# Patient Record
Sex: MALE | Race: WHITE | NOT HISPANIC OR LATINO | Employment: FULL TIME | URBAN - METROPOLITAN AREA
[De-identification: names, ages, dates, MRNs, and addresses within clinical notes are randomized per-mention and may not be internally consistent; named-entity substitution may affect disease eponyms.]

---

## 2018-01-17 ENCOUNTER — HOSPITAL ENCOUNTER (INPATIENT)
Facility: HOSPITAL | Age: 38
LOS: 3 days | Discharge: HOME | DRG: 083 | End: 2018-01-20
Attending: EMERGENCY MEDICINE | Admitting: PSYCHIATRY & NEUROLOGY
Payer: COMMERCIAL

## 2018-01-17 DIAGNOSIS — R40.20 LOSS OF CONSCIOUSNESS: ICD-10-CM

## 2018-01-17 DIAGNOSIS — I60.9 SAH (SUBARACHNOID HEMORRHAGE): Primary | ICD-10-CM
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
## 2018-01-17 DIAGNOSIS — S06.5XAA SUBDURAL HEMATOMA: ICD-10-CM
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
## 2018-01-17 DIAGNOSIS — R56.9 SEIZURE: ICD-10-CM

## 2018-01-17 LAB
ABO + RH BLD: NORMAL
ALBUMIN SERPL BCP-MCNC: 4.1 G/DL
ALP SERPL-CCNC: 71 U/L
ALT SERPL W/O P-5'-P-CCNC: 29 U/L
AMPHET+METHAMPHET UR QL: NEGATIVE
ANION GAP SERPL CALC-SCNC: 9 MMOL/L
AST SERPL-CCNC: 24 U/L
BARBITURATES UR QL SCN>200 NG/ML: NEGATIVE
BASOPHILS # BLD AUTO: 0.05 K/UL
BASOPHILS NFR BLD: 0.2 %
BENZODIAZ UR QL SCN>200 NG/ML: NEGATIVE
BILIRUB SERPL-MCNC: 0.4 MG/DL
BILIRUB UR QL STRIP: NEGATIVE
BLD GP AB SCN CELLS X3 SERPL QL: NORMAL
BUN SERPL-MCNC: 14 MG/DL
BZE UR QL SCN: NEGATIVE
CALCIUM SERPL-MCNC: 9.1 MG/DL
CANNABINOIDS UR QL SCN: NEGATIVE
CHLORIDE SERPL-SCNC: 102 MMOL/L
CLARITY UR REFRACT.AUTO: CLEAR
CO2 SERPL-SCNC: 27 MMOL/L
COLOR UR AUTO: YELLOW
CREAT SERPL-MCNC: 0.8 MG/DL
CREAT UR-MCNC: 67 MG/DL
DIFFERENTIAL METHOD: ABNORMAL
EOSINOPHIL # BLD AUTO: 0 K/UL
EOSINOPHIL NFR BLD: 0 %
ERYTHROCYTE [DISTWIDTH] IN BLOOD BY AUTOMATED COUNT: 12.6 %
EST. GFR  (AFRICAN AMERICAN): >60 ML/MIN/1.73 M^2
EST. GFR  (NON AFRICAN AMERICAN): >60 ML/MIN/1.73 M^2
ETHANOL UR-MCNC: <10 MG/DL
GLUCOSE SERPL-MCNC: 143 MG/DL
GLUCOSE UR QL STRIP: NEGATIVE
HCT VFR BLD AUTO: 43.8 %
HGB BLD-MCNC: 15.9 G/DL
HGB UR QL STRIP: NEGATIVE
IMM GRANULOCYTES # BLD AUTO: 0.2 K/UL
IMM GRANULOCYTES NFR BLD AUTO: 0.9 %
INR PPP: 1
KETONES UR QL STRIP: ABNORMAL
LACTATE SERPL-SCNC: 1.7 MMOL/L
LEUKOCYTE ESTERASE UR QL STRIP: NEGATIVE
LYMPHOCYTES # BLD AUTO: 1.2 K/UL
LYMPHOCYTES NFR BLD: 5.6 %
MCH RBC QN AUTO: 32.4 PG
MCHC RBC AUTO-ENTMCNC: 36.3 G/DL
MCV RBC AUTO: 89 FL
METHADONE UR QL SCN>300 NG/ML: NEGATIVE
MONOCYTES # BLD AUTO: 0.8 K/UL
MONOCYTES NFR BLD: 3.4 %
NEUTROPHILS # BLD AUTO: 19.7 K/UL
NEUTROPHILS NFR BLD: 89.9 %
NITRITE UR QL STRIP: NEGATIVE
NRBC BLD-RTO: 0 /100 WBC
OPIATES UR QL SCN: NEGATIVE
PCP UR QL SCN>25 NG/ML: NEGATIVE
PH UR STRIP: 7 [PH] (ref 5–8)
PLATELET # BLD AUTO: 304 K/UL
PMV BLD AUTO: 9.3 FL
POTASSIUM SERPL-SCNC: 4.2 MMOL/L
PROCALCITONIN SERPL IA-MCNC: 0.03 NG/ML
PROT SERPL-MCNC: 7.8 G/DL
PROT UR QL STRIP: NEGATIVE
PROTHROMBIN TIME: 10.5 SEC
RBC # BLD AUTO: 4.91 M/UL
SODIUM SERPL-SCNC: 138 MMOL/L
SP GR UR STRIP: >=1.03 (ref 1–1.03)
TOXICOLOGY INFORMATION: NORMAL
URN SPEC COLLECT METH UR: ABNORMAL
UROBILINOGEN UR STRIP-ACNC: NEGATIVE EU/DL
WBC # BLD AUTO: 21.96 K/UL

## 2018-01-17 PROCEDURE — 96366 THER/PROPH/DIAG IV INF ADDON: CPT

## 2018-01-17 PROCEDURE — 63600175 PHARM REV CODE 636 W HCPCS

## 2018-01-17 PROCEDURE — 85025 COMPLETE CBC W/AUTO DIFF WBC: CPT

## 2018-01-17 PROCEDURE — 85610 PROTHROMBIN TIME: CPT

## 2018-01-17 PROCEDURE — 83605 ASSAY OF LACTIC ACID: CPT

## 2018-01-17 PROCEDURE — 99221 1ST HOSP IP/OBS SF/LOW 40: CPT | Mod: ,,, | Performed by: NURSE PRACTITIONER

## 2018-01-17 PROCEDURE — 25500020 PHARM REV CODE 255: Performed by: EMERGENCY MEDICINE

## 2018-01-17 PROCEDURE — 12000002 HC ACUTE/MED SURGE SEMI-PRIVATE ROOM

## 2018-01-17 PROCEDURE — 96367 TX/PROPH/DG ADDL SEQ IV INF: CPT

## 2018-01-17 PROCEDURE — 99254 IP/OBS CNSLTJ NEW/EST MOD 60: CPT | Mod: ,,, | Performed by: NEUROLOGICAL SURGERY

## 2018-01-17 PROCEDURE — 96375 TX/PRO/DX INJ NEW DRUG ADDON: CPT | Mod: 59

## 2018-01-17 PROCEDURE — 63600175 PHARM REV CODE 636 W HCPCS: Performed by: PSYCHIATRY & NEUROLOGY

## 2018-01-17 PROCEDURE — 25000003 PHARM REV CODE 250: Performed by: PHYSICIAN ASSISTANT

## 2018-01-17 PROCEDURE — 96365 THER/PROPH/DIAG IV INF INIT: CPT

## 2018-01-17 PROCEDURE — 63600175 PHARM REV CODE 636 W HCPCS: Performed by: PHYSICIAN ASSISTANT

## 2018-01-17 PROCEDURE — 99291 CRITICAL CARE FIRST HOUR: CPT | Mod: 25

## 2018-01-17 PROCEDURE — 25000003 PHARM REV CODE 250: Performed by: PSYCHIATRY & NEUROLOGY

## 2018-01-17 PROCEDURE — 99291 CRITICAL CARE FIRST HOUR: CPT | Mod: ,,, | Performed by: EMERGENCY MEDICINE

## 2018-01-17 PROCEDURE — 63600175 PHARM REV CODE 636 W HCPCS: Performed by: EMERGENCY MEDICINE

## 2018-01-17 PROCEDURE — 80053 COMPREHEN METABOLIC PANEL: CPT

## 2018-01-17 PROCEDURE — 86850 RBC ANTIBODY SCREEN: CPT

## 2018-01-17 PROCEDURE — 63600175 PHARM REV CODE 636 W HCPCS: Performed by: NURSE PRACTITIONER

## 2018-01-17 PROCEDURE — 80307 DRUG TEST PRSMV CHEM ANLYZR: CPT

## 2018-01-17 PROCEDURE — 81003 URINALYSIS AUTO W/O SCOPE: CPT

## 2018-01-17 PROCEDURE — 84145 PROCALCITONIN (PCT): CPT

## 2018-01-17 RX ORDER — LEVETIRACETAM 10 MG/ML
1000 INJECTION INTRAVASCULAR
Status: COMPLETED | OUTPATIENT
Start: 2018-01-17 | End: 2018-01-17

## 2018-01-17 RX ORDER — ACETAMINOPHEN 325 MG/1
650 TABLET ORAL
Status: DISCONTINUED | OUTPATIENT
Start: 2018-01-17 | End: 2018-01-17

## 2018-01-17 RX ORDER — SODIUM CHLORIDE 9 MG/ML
INJECTION, SOLUTION INTRAVENOUS CONTINUOUS
Status: DISCONTINUED | OUTPATIENT
Start: 2018-01-17 | End: 2018-01-20 | Stop reason: HOSPADM

## 2018-01-17 RX ORDER — ONDANSETRON 2 MG/ML
4 INJECTION INTRAMUSCULAR; INTRAVENOUS EVERY 8 HOURS PRN
Status: DISCONTINUED | OUTPATIENT
Start: 2018-01-17 | End: 2018-01-20 | Stop reason: HOSPADM

## 2018-01-17 RX ORDER — LEVETIRACETAM 5 MG/ML
500 INJECTION INTRAVASCULAR
Status: DISCONTINUED | OUTPATIENT
Start: 2018-01-17 | End: 2018-01-17

## 2018-01-17 RX ORDER — DEXAMETHASONE SODIUM PHOSPHATE 4 MG/ML
10 INJECTION, SOLUTION INTRA-ARTICULAR; INTRALESIONAL; INTRAMUSCULAR; INTRAVENOUS; SOFT TISSUE ONCE
Status: COMPLETED | OUTPATIENT
Start: 2018-01-17 | End: 2018-01-17

## 2018-01-17 RX ORDER — ONDANSETRON 2 MG/ML
4 INJECTION INTRAMUSCULAR; INTRAVENOUS
Status: COMPLETED | OUTPATIENT
Start: 2018-01-17 | End: 2018-01-17

## 2018-01-17 RX ORDER — ACETAMINOPHEN 10 MG/ML
1000 INJECTION, SOLUTION INTRAVENOUS
Status: COMPLETED | OUTPATIENT
Start: 2018-01-17 | End: 2018-01-17

## 2018-01-17 RX ORDER — FENTANYL CITRATE 50 UG/ML
12.5 INJECTION, SOLUTION INTRAMUSCULAR; INTRAVENOUS
Status: COMPLETED | OUTPATIENT
Start: 2018-01-17 | End: 2018-01-17

## 2018-01-17 RX ORDER — FENTANYL CITRATE 50 UG/ML
12.5 INJECTION, SOLUTION INTRAMUSCULAR; INTRAVENOUS
Status: DISCONTINUED | OUTPATIENT
Start: 2018-01-17 | End: 2018-01-19

## 2018-01-17 RX ORDER — FENTANYL CITRATE 50 UG/ML
INJECTION, SOLUTION INTRAMUSCULAR; INTRAVENOUS
Status: COMPLETED
Start: 2018-01-17 | End: 2018-01-17

## 2018-01-17 RX ORDER — ACETAMINOPHEN 325 MG/1
650 TABLET ORAL EVERY 4 HOURS PRN
Status: DISCONTINUED | OUTPATIENT
Start: 2018-01-17 | End: 2018-01-19

## 2018-01-17 RX ORDER — NICARDIPINE HYDROCHLORIDE 0.2 MG/ML
2.5 INJECTION INTRAVENOUS CONTINUOUS
Status: DISCONTINUED | OUTPATIENT
Start: 2018-01-17 | End: 2018-01-18

## 2018-01-17 RX ADMIN — ACETAMINOPHEN 1000 MG: 10 INJECTION, SOLUTION INTRAVENOUS at 12:01

## 2018-01-17 RX ADMIN — LEVETIRACETAM 1000 MG: 10 INJECTION INTRAVENOUS at 01:01

## 2018-01-17 RX ADMIN — FENTANYL CITRATE 12.5 MCG: 50 INJECTION INTRAMUSCULAR; INTRAVENOUS at 06:01

## 2018-01-17 RX ADMIN — IOHEXOL 75 ML: 350 INJECTION, SOLUTION INTRAVENOUS at 02:01

## 2018-01-17 RX ADMIN — FENTANYL CITRATE 12.5 MCG: 50 INJECTION INTRAMUSCULAR; INTRAVENOUS at 02:01

## 2018-01-17 RX ADMIN — FENTANYL CITRATE 12.5 MCG: 50 INJECTION, SOLUTION INTRAMUSCULAR; INTRAVENOUS at 07:01

## 2018-01-17 RX ADMIN — SODIUM CHLORIDE: 0.9 INJECTION, SOLUTION INTRAVENOUS at 08:01

## 2018-01-17 RX ADMIN — LEVETIRACETAM 500 MG: 5 INJECTION INTRAVENOUS at 12:01

## 2018-01-17 RX ADMIN — ONDANSETRON 4 MG: 2 INJECTION INTRAMUSCULAR; INTRAVENOUS at 11:01

## 2018-01-17 RX ADMIN — FENTANYL CITRATE 12.5 MCG: 50 INJECTION, SOLUTION INTRAMUSCULAR; INTRAVENOUS at 02:01

## 2018-01-17 RX ADMIN — FENTANYL CITRATE 12.5 MCG: 50 INJECTION INTRAMUSCULAR; INTRAVENOUS at 04:01

## 2018-01-17 RX ADMIN — NICARDIPINE HYDROCHLORIDE 2.5 MG/HR: 0.2 INJECTION, SOLUTION INTRAVENOUS at 02:01

## 2018-01-17 RX ADMIN — DEXAMETHASONE SODIUM PHOSPHATE 10 MG: 4 INJECTION, SOLUTION INTRAMUSCULAR; INTRAVENOUS at 08:01

## 2018-01-17 RX ADMIN — ONDANSETRON 4 MG: 2 INJECTION INTRAMUSCULAR; INTRAVENOUS at 08:01

## 2018-01-17 NOTE — HPI
Thuan Reynolds is a 37 y.o. 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 + LOC of unknown duration. Possible witnessed seizure activity by apparent bystander who is not at bedside. CT head on arrival shows bifrontal hemorrhagic contusions. No witnessed seizure activity while in ED. Currently c/o severe frontal HA and photophobia. No anticoagulant use.

## 2018-01-17 NOTE — HPI
MR Reynolds is a 37 year old male with a PMH of bell's palsy who presents to [facility] s/p Fall with DX of SDH and SAH and witnesses seizure activity.  He went to work and the last thing that he recalls is walking out the back door of the clinic. He suffered a fall on icy ground outside, which was witnessed by a bystander who reported seizure type activity. He sustained loss of consciousness following the injury. Patient has amnesia to the event and reports waking in an ambulance with a severe diffuse frontal headache en route to the ED. He endorses nausea. He denies vision changes, neck pain/stiffness, rhino/ottorrhea, vomiting, weakness or extremity numbness. He is being admitted to [facility] for a higher level of care.

## 2018-01-17 NOTE — SUBJECTIVE & OBJECTIVE
(Not in a hospital admission)    Review of patient's allergies indicates:  No Known Allergies    History reviewed. No pertinent past medical history.  Past Surgical History:   Procedure Laterality Date    VARIOCOCELE REPAIR  2008    left side     Family History     Problem Relation (Age of Onset)    Diabetes Father    Heart failure Father        Social History Main Topics    Smoking status: Current Every Day Smoker     Years: 8.00     Types: Cigarettes    Smokeless tobacco: Never Used    Alcohol use Yes    Drug use: No    Sexual activity: Yes     Partners: Female     Review of Systems   Constitutional: no fever, chills or night sweats. No changes in weight   Eyes: no visual changes   ENT: no nasal congestion or sore throat   Respiratory: no cough or shortness of breath   Cardiovascular: no chest pain or palpitations   Gastrointestinal: no nausea or vomiting   Genitourinary: no hematuria or dysuria   Integument/Breast: no rash or pruritis   Hematologic/Lymphatic: no easy bruising or lymphadenopathy   Musculoskeletal: no arthralgias or myalgias.   Neurological: no seizures or tremors   Behavioral/Psych: no auditory or visual hallucinations   Endocrine: no heat or cold intolerance   Objective:     Weight: 111.1 kg (245 lb)  Body mass index is 36.18 kg/m².  Vital Signs (Most Recent):  Temp: 97.6 °F (36.4 °C) (01/17/18 1012)  Pulse: (!) 49 (01/17/18 1250)  Resp: 16 (01/17/18 1200)  BP: (!) 154/70 (01/17/18 1250)  SpO2: 98 % (01/17/18 1250) Vital Signs (24h Range):  Temp:  [97.6 °F (36.4 °C)] 97.6 °F (36.4 °C)  Pulse:  [49-68] 49  Resp:  [16-20] 16  SpO2:  [98 %-100 %] 98 %  BP: (122-161)/(68-87) 154/70                           Neurosurgery Physical Exam  General: well developed, well nourished, no distress.   Head: normocephalic, atraumatic  Neurologic: Alert and oriented. Thought content appropriate.  GCS: Motor: 6/Verbal: 5/Eyes: 4 GCS Total: 15  Mental Status: Awake, Alert, Oriented x 4  Language: No  aphasia  Speech: No dysarthria  Cranial nerves: face symmetric with L periorbital ecchymosis and mild L ptosis, tongue midline, CN II-XII grossly intact.   Eyes: pupils equal, round, reactive to light with accomodation, EOMI.  Pulmonary: normal respirations, no signs of respiratory distress  Abdomen: soft, non-distended, not tender to palpation  Sensory: intact to light touch throughout    Motor Strength: Moves all extremities spontaneously with good tone.  Full strength upper and lower extremities. No abnormal movements seen.     DTR's: 2 + and symmetric in UE and LE  Pronator Drift: no drift noted  Finger-to-nose: Intact bilaterally  Eng: absent  Clonus: absent  Babinski: absent  Pulses: 2+ and symmetric radial and dorsalis pedis.  Skin: Skin is warm, dry and intact.    Significant Labs:    Recent Labs  Lab 01/17/18  1247   *      K 4.2      CO2 27   BUN 14   CREATININE 0.8   CALCIUM 9.1       Recent Labs  Lab 01/17/18  1247   WBC 21.96*   HGB 15.9   HCT 43.8          Recent Labs  Lab 01/17/18  1247   INR 1.0     Microbiology Results (last 7 days)     ** No results found for the last 168 hours. **          Significant Diagnostics:  CT head 01/17/2018 reviewed.  Small regions of hyperdensity within the inferior frontal lobes bilaterally with subtle associated hypodensity compatible with hemorrhagic contusions and edema.    Small volume subarachnoid hemorrhage within the adjacent inferior frontal sulci and filling the olfactory recess.    Trace probable layering subdural hemorrhage along the tentorium cerebelli

## 2018-01-17 NOTE — ASSESSMENT & PLAN NOTE
37 y.o. male with traumatic bifrontal hemorrhagic contusions and tentorium SDH.    - Keppra 1g, then 500mg BID.  - Repeat CT 1730.  - Admit to NCC.   - Neuro checks Qhour.  - Will continue to follow closely.

## 2018-01-17 NOTE — CONSULTS
[facility]  Neurosurgery  Consult Note    Inpatient consult to Neurosurgery  Consult performed by: ERNESTINE PERES  Consult ordered by: SAL FENG        Subjective:     Chief Complaint/Reason for Admission: R bifrontal contusions    History of Present Illness: Thuan Reynolds is a 37 y.o. male without significant PMH who presents to [facility] ED after witnessed fall outside of his physical therapy clinic. + LOC of unknown duration. Possible witnessed seizure activity by apparent bystander who is not at bedside. CT head on arrival shows bifrontal hemorrhagic contusions. No witnessed seizure activity while in ED. Currently c/o severe frontal HA and photophobia. No anticoagulant use.      (Not in a hospital admission)    Review of patient's allergies indicates:  No Known Allergies    History reviewed. No pertinent past medical history.  Past Surgical History:   Procedure Laterality Date    VARIOCOCELE REPAIR  2008    left side     Family History     Problem Relation (Age of Onset)    Diabetes Father    Heart failure Father        Social History Main Topics    Smoking status: Current Every Day Smoker     Years: 8.00     Types: Cigarettes    Smokeless tobacco: Never Used    Alcohol use Yes    Drug use: No    Sexual activity: Yes     Partners: Female     Review of Systems   Constitutional: no fever, chills or night sweats. No changes in weight   Eyes: no visual changes   ENT: no nasal congestion or sore throat   Respiratory: no cough or shortness of breath   Cardiovascular: no chest pain or palpitations   Gastrointestinal: no nausea or vomiting   Genitourinary: no hematuria or dysuria   Integument/Breast: no rash or pruritis   Hematologic/Lymphatic: no easy bruising or lymphadenopathy   Musculoskeletal: no arthralgias or myalgias.   Neurological: no seizures or tremors   Behavioral/Psych: no auditory or visual hallucinations   Endocrine: no heat or cold intolerance   Objective:      Weight: 111.1 kg (245 lb)  Body mass index is 36.18 kg/m².  Vital Signs (Most Recent):  Temp: 97.6 °F (36.4 °C) (01/17/18 1012)  Pulse: (!) 49 (01/17/18 1250)  Resp: 16 (01/17/18 1200)  BP: (!) 154/70 (01/17/18 1250)  SpO2: 98 % (01/17/18 1250) Vital Signs (24h Range):  Temp:  [97.6 °F (36.4 °C)] 97.6 °F (36.4 °C)  Pulse:  [49-68] 49  Resp:  [16-20] 16  SpO2:  [98 %-100 %] 98 %  BP: (122-161)/(68-87) 154/70                           Neurosurgery Physical Exam  General: well developed, well nourished, no distress.   Head: normocephalic, atraumatic  Neurologic: Alert and oriented. Thought content appropriate.  GCS: Motor: 6/Verbal: 5/Eyes: 4 GCS Total: 15  Mental Status: Awake, Alert, Oriented x 4  Language: No aphasia  Speech: No dysarthria  Cranial nerves: face symmetric with L periorbital ecchymosis and mild L ptosis, tongue midline, CN II-XII grossly intact.   Eyes: pupils equal, round, reactive to light with accomodation, EOMI.  Pulmonary: normal respirations, no signs of respiratory distress  Abdomen: soft, non-distended, not tender to palpation  Sensory: intact to light touch throughout    Motor Strength: Moves all extremities spontaneously with good tone.  Full strength upper and lower extremities. No abnormal movements seen.     DTR's: 2 + and symmetric in UE and LE  Pronator Drift: no drift noted  Finger-to-nose: Intact bilaterally  Eng: absent  Clonus: absent  Babinski: absent  Pulses: 2+ and symmetric radial and dorsalis pedis.  Skin: Skin is warm, dry and intact.    Significant Labs:    Recent Labs  Lab 01/17/18  1247   *      K 4.2      CO2 27   BUN 14   CREATININE 0.8   CALCIUM 9.1       Recent Labs  Lab 01/17/18  1247   WBC 21.96*   HGB 15.9   HCT 43.8          Recent Labs  Lab 01/17/18  1247   INR 1.0     Microbiology Results (last 7 days)     ** No results found for the last 168 hours. **          Significant Diagnostics:  CT head 01/17/2018 reviewed.  Small regions  of hyperdensity within the inferior frontal lobes bilaterally with subtle associated hypodensity compatible with hemorrhagic contusions and edema.    Small volume subarachnoid hemorrhage within the adjacent inferior frontal sulci and filling the olfactory recess.    Trace probable layering subdural hemorrhage along the tentorium cerebelli    Assessment/Plan:     SAH (subarachnoid hemorrhage)    37 y.o. male with traumatic bifrontal hemorrhagic contusions and tentorium SDH.    - Keppra 1g, then 500mg BID.  - Repeat CT 1730.  - Admit to NCC.   - Neuro checks Qhour.  - Will continue to follow closely.            Discussed with Dr. Jose.    GODFREY VázquezC  Neurosurgery  [facility]

## 2018-01-17 NOTE — ASSESSMENT & PLAN NOTE
- NSGY consulted  - CTH: Small regions of hyperdensity within the inferior frontal lobes bilaterally with subtle associated hypodensity compatible with hemorrhagic contusions and edema.Small volume subarachnoid hemorrhage within the adjacent inferior frontal sulci and filling the olfactory recess. Trace probable layering subdural hemorrhage along the tentorium cerebelli  - Repeat CTH pending   - Neuro checks Q 1  - SBP < 140   - CTA pending   - PT/OT/SP  - NPO   - Vitals Q 1   -HOB 30

## 2018-01-17 NOTE — HOSPITAL COURSE
1/17: Admit NCC CTH: Small regions of hyperdensity within the inferior frontal lobes bilaterally with subtle associated hypodensity compatible with hemorrhagic contusions and edema.Small volume subarachnoid hemorrhage within the adjacent inferior frontal sulci and filling the olfactory recess. Trace probable layering subdural hemorrhage along the tentorium cerebelli  1/18: No significant clinical neurological dysfunction. No evidence of CSF leak. CT head with bifrontal hemorrhagic contusions at the base of the frontal lobe. CT C -spine OK.Mild change on the frontal lobe. WBC elevated.  `/`8: No significant clinical neurological dysfunction. No evidence of CSF leak. CT head with bifrontal hemorrhagic contusions at the base of the frontal lobe. CT C -spine OK.Mild change on the frontal lobe. WBC elevated.

## 2018-01-17 NOTE — ED NOTES
"Report received--pt c/o HA and N/V after hitting his head today--"my head is pounding"--denies blurred vision--pt doesn't remember falling--family at bedside  "

## 2018-01-17 NOTE — ED PROVIDER NOTES
Encounter Date: 1/17/2018    SCRIBE #1 NOTE: I, Mehnaz Mason, am scribing for, and in the presence of,  Dr. Zhou. I have scribed the following portions of the note - the APC attestation. Other sections scribed: CT Head Reading.       History     Chief Complaint   Patient presents with    Head Injury     Pt arrives [facility] from home after a fall sustaining hit to back of head - no obvious injury noted, but pt does not remember event and complains of pain to back of head      This is a 37 year old male with a PMH of bell's palsy who presents to the ED with a chief complaint of head injury. Patient reports waking in his usual state of health this morning. He went to work and the last thing that he recalls is walking out the back door of the clinic. He suffered a fall on icy ground outside, which was witnessed by an apparent bystander who is not at the bedside. There was reported seizure type activity. He sustained loss of consciousness following the injury, of unknown duration. Patient has amnesia to the event and reports waking in an ambulance with a severe diffuse frontal headache en route to the ED. He endorses nausea and photophobia. He denies vision changes, neck pain/stiffness, rhino/ottorrhea, vomiting, weakness or extremity numbness. Patient denies recent headaches or dizziness.          Review of patient's allergies indicates:  No Known Allergies  Past Medical History:   Diagnosis Date    Seizure      Past Surgical History:   Procedure Laterality Date    VARIOCOCELE REPAIR  2008    left side     Family History   Problem Relation Age of Onset    Diabetes Father     Heart failure Father      Social History   Substance Use Topics    Smoking status: Current Every Day Smoker     Years: 8.00     Types: Cigarettes    Smokeless tobacco: Never Used    Alcohol use Yes     Review of Systems   Constitutional: Negative for chills and fever.   HENT: Negative for sore throat.    Respiratory: Negative for shortness  of breath.    Cardiovascular: Negative for chest pain.   Gastrointestinal: Positive for nausea. Negative for vomiting.   Genitourinary: Negative for dysuria.   Musculoskeletal: Negative for back pain, neck pain and neck stiffness.   Skin: Negative for rash.   Neurological: Positive for headaches. Negative for weakness.   Hematological: Does not bruise/bleed easily.       Physical Exam     Initial Vitals [01/17/18 1012]   BP Pulse Resp Temp SpO2   (!) 140/85 68 20 97.6 °F (36.4 °C) 100 %      MAP       103.33         Physical Exam    Constitutional: He appears well-developed and well-nourished.   HENT:   Head: Head is with raccoon's eyes (left). Head is without Jones's sign, without abrasion and without contusion.   Right Ear: Tympanic membrane and ear canal normal.   Left Ear: Tympanic membrane and ear canal normal.   Nose: Nose normal. No rhinorrhea.   Mouth/Throat: Uvula is midline, oropharynx is clear and moist and mucous membranes are normal.   Eyes: Conjunctivae and EOM are normal. Pupils are equal, round, and reactive to light.   Neck: Normal range of motion and full passive range of motion without pain. Neck supple. No spinous process tenderness present. No neck rigidity.   Cardiovascular: Normal rate, regular rhythm and normal heart sounds.   Pulmonary/Chest: Breath sounds normal. No respiratory distress. He has no wheezes. He has no rhonchi. He has no rales.   Abdominal: Soft. Bowel sounds are normal. There is no tenderness. There is no rebound and no guarding.   Neurological: He is alert and oriented to person, place, and time. He has normal strength and normal reflexes. No sensory deficit.   Mild left ptosis.  Symmetrical forehead wrinkle.   No tongue deviation.   Skin: Skin is warm and dry. No rash noted.         ED Course   Procedures  Labs Reviewed   CBC W/ AUTO DIFFERENTIAL - Abnormal; Notable for the following:        Result Value    WBC 21.96 (*)     MCH 32.4 (*)     MCHC 36.3 (*)     Immature  Granulocytes 0.9 (*)     Gran # 19.7 (*)     Immature Grans (Abs) 0.20 (*)     Gran% 89.9 (*)     Lymph% 5.6 (*)     Mono% 3.4 (*)     All other components within normal limits   COMPREHENSIVE METABOLIC PANEL - Abnormal; Notable for the following:     Glucose 143 (*)     All other components within normal limits   URINALYSIS - Abnormal; Notable for the following:     Specific Gravity, UA >=1.030 (*)     Ketones, UA 1+ (*)     All other components within normal limits    Narrative:     extra urine   PROTIME-INR   TOXICOLOGY SCREEN, URINE, RANDOM (COMPLIANCE)    Narrative:     extra urine   PROCALCITONIN   LACTIC ACID, PLASMA   PROCALCITONIN    Narrative:     ADD ON PCAL PER MD ALVIN @  01/17/2018  14:41 ORDER #[id number]   TYPE & SCREEN   POCT GLUCOSE MONITORING CONTINUOUS         Imaging Results          X-ray chest AP portable (Final result)  Result time 01/17/18 15:33:36    Final result by Joaquín Barrios MD (01/17/18 15:33:36)                 Impression:      No acute chest disease identified.      Electronically signed by: JOAQUÍN BARRIOS MD  Date:     01/17/18  Time:    15:33              Narrative:    A single portable AP radiograph of the chest was obtained.  The heart and mediastinal structures are unremarkable.  Pulmonary vasculature is within normal limits.  The lungs are well aerated and free of focal consolidations.  There is no evidence for pneumothorax or large pleural effusions.  Bony structures appear intact.                             CTA Head (Final result)  Result time 01/17/18 14:41:50    Final result by Sean White DO (01/17/18 14:41:50)                 Impression:     Evolving bilateral inferior frontal hemorrhagic contusions with developing hypoattenuation compatible with associated edema.    Small components of extra-axial hemorrhage underlying the compatible with subarachnoid and a probable small component of subdural hemorrhages.    There is a subtle subcentimeter focus of additional  hemorrhage overlying the left parasagittal frontal lobe at the vertex which may be cortical or loculated subdural.    Continued trace probable subdural hemorrhage along the tentorium cerebelli.    Nondepressed left parietal calvarial fracture at the vertex    Unremarkable CTA of the head specifically without evidence for proximal significant stenosis or intracranial aneurysm.            Electronically signed by: JS HADLEY HAMEED  Date:     01/17/18  Time:    14:41              Narrative:       Procedure: Postcontrast CTA of the head     Technique:5-mm axial images the head  pre-and postcontrast in addition 0.5 mm axial CTA images of the head postcontrast with coronal and sagittal reformatted imaging. 100 cc of Omnipaque 350 IV contrast administered.      Comparison:CT head 01/17/2018          Results:    CT head with and without contrast:  Evolving bilateral inferior frontal hemorrhagic contusions with subadjacent extra-axial hyperdensity concerning for subarachnoid and subdural hemorrhages. There is a small additional hemorrhage within or overlying the left frontal cortex near the vertex.    There is a nondepressed fracture within the left parietal calvarium at the vertex extending from the parasagittal left coronal suture to the sagittal suture.      There is no significant increase mass effect or midline shift. Ventral stable without hydrocephalus.  There is no abnormal parenchymal enhancement allowing for scattered hemorrhages.    There is continued trace probable subdural hemorrhage along the superior surface of the tentorium cerebelli right greater than left.          CTA head:  Anterior circulation: The bilateral distal cervical, petrous, cavernous and supraclinoid segments of the ICAs are patent without significant focal stenosis or aneurysm.      Atherosclerotic plaquing of the cavernous segments of the ICAs.  The anterior and middle cerebral arteries are patent without significant focal stenosis or  aneurysm.    Posterior circulation: The distal left vertebral artery is slightly dominant.  The distal vertebral arteries, basilar artery and posterior cerebral arteries are patent without significant focal stenosis or aneurysm.  There are bilateral posterior communicating arteries.                             CT Maxillofacial Without Contrast (Final result)  Result time 01/17/18 14:46:45    Final result by Sean White DO (01/17/18 14:46:45)                 Impression:    Unremarkable CT maxillofacial bones specifically without evidence for acute maxillofacial bone fracture.    Please see above for additional details.          Electronically signed by: SEAN WHITE DO  Date:     01/17/18  Time:    14:46              Narrative:    Procedure:Noncontrast CT of the maxillofacial bones    Technique:2.5 mm axial images of the maxillofacial bones without contrast with coronal reformatted imaging from the axial acquisition      Comparison:Concomitant CTA head     Finding:Please note left parasagittal parietal calvarial fracture is not included in the field-of-view. Please see CTA head report for further details    There is no evidence for acute fracture of the maxilla facial bones. The bony orbits are intact. No evidence for mandibular fracture or dislocation. Partial cerumen opacification of the EACs bilaterally.    There is trace mucosal thickening in the ethmoid air cells. The heart there is slight rightward deviation the nasal septum. No significant additional paranasal sinus opacification. Please note the inferior frontal hemorrhagic contusions and scattered intracranial hemorrhages are less apparent related to differences in technique.                             CT Head Without Contrast (Final result)  Result time 01/17/18 11:54:44    Final result by Sean White DO (01/17/18 11:54:44)                 Impression:     Small regions of hyperdensity within the inferior frontal lobes bilaterally with subtle  associated hypodensity compatible with hemorrhagic contusions and edema.    Small volume subarachnoid hemorrhage within the adjacent inferior frontal sulci and filling the olfactory recess.    Trace probable layering subdural hemorrhage along the tentorium cerebelli    Clinical correlation and followup advised      Electronically signed by: JS BUTCHER DO  Date:     01/17/18  Time:    11:54              Narrative:    CT brain without contrast.    Comparison: None     Technique: Multiple 5 mm axial images of the head were obtained without intravenous contrast.    Results: There is small regions of hyperattenuation within the inferior frontal lobes bilaterally with subtle surrounding edema compatible with hemorrhagic contusions with adjacent hyperdensity within the extra-axial space concern for subarachnoid extension of hemorrhage.    In addition there is a thin hyperdense collection along the tentorium cerebelli measuring at most 1 mm in thickness slightly greater on the right concerning for small volume layering subdural hemorrhage.    The ventricles are normal without hydrocephalus. There is no midline shift or significant mass effect                                X-Rays:   Independently Interpreted Readings:   Head CT: Bilateral frontal contusions with SAH and SDH     Medical Decision Making:   History:   Old Medical Records: I decided to obtain old medical records.  Independently Interpreted Test(s):   I have ordered and independently interpreted X-rays - see prior notes.  Clinical Tests:   Lab Tests: Ordered and Reviewed  Radiological Study: Ordered and Reviewed  Other:   I have discussed this case with another health care provider.       APC / Resident Notes:   37 year old male presents with head injury, + LOC, and retrograde amnesia.  On exam he is afebrile and nontoxic. He appears uncomfortable. There are no superficial skin abrasions or open wounds. He has mild left ptosis with an otherwise normal  neurological exam.     DDX includes but is not limited to concussion, ICH, skull fracture.    Head CT with hemorrhagic contusions of the bilateral frontal lobes, subarachnoid hemorrhage within the inferior frontal sulci, and a subdural hematoma along the tentorium cerebelli.    12:14 PM Discussed with neurosurgery, appreciate consult.  Patient will be admitted to Neuro ICU.    12:20PM Patient's wife reports he had approximately 200ccs of emesis on the way to CT. New findings of left periorbital ecchymosis noted, CT Maxillofacial views ordered. Zofran given.    12:28 PM Start Keppra 500mg BID, per NSGY ELIZABETH Siegel.  Discussed with Neurocritical care, patient will be admitted to Neuro ICU.  I discussed the care of this patient with my supervising MD.        Scribe Attestation:   Scribe #1: I performed the above scribed service and the documentation accurately describes the services I performed. I attest to the accuracy of the note.    Attending Attestation:     Physician Attestation Statement for NP/PA:   I have conducted a face to face encounter with this patient in addition to the NP/PA, due to Medical Complexity    Other NP/PA Attestation Additions:      Medical Decision Makin year old male presenting after unwitnessed fall and possible seizure activity. Has amnesia to the event. On exam he complains of severe headache, has left periorbital bruising but otherwise no signs of trauma and a non focal neural exam. He has a CT that demonstrates hemorrhage, it not on anticoagulants. Neurosurgery consulted.      Attending Critical Care:   Critical Care Times:   Direct Patient Care (initial evaluation, reassessments, and time considering the case)................................................................25 minutes.   Additional History from reviewing old medical records or taking additional history from the family, EMS, PCP, etc.......................5 minutes.   Ordering, Reviewing, and Interpreting Diagnostic  Studies...............................................................................................................5 minutes.   Documentation..................................................................................................................................................................................5 minutes.   Consultation with other Physicians. .................................................................................................................................................10 minutes.   Consultation with the patient's family directly relating to the patient's condition, care, and DNR status (when patient unable)......5 minutes.   ==============================================================  · Total Critical Care Time - exclusive of procedural time: 55 minutes.  ==============================================================  Critical care was necessary to treat or prevent imminent or life-threatening deterioration of the following conditions: trauma.               ED Course      Clinical Impression:   The primary encounter diagnosis was SAH (subarachnoid hemorrhage). Diagnoses of Subdural hematoma, Fall, initial encounter, Loss of consciousness, and Seizure were also pertinent to this visit.    Disposition:   Disposition: Admitted  Condition: Critical                        Gabrielle Cantor PA-C  01/17/18 1707       Maryse Zhou MD  01/17/18 5685

## 2018-01-17 NOTE — MEDICAL/APP STUDENT
History     Chief Complaint   Patient presents with    Head Injury     Pt arrives [facility] from home after a fall sustaining hit to back of head - no obvious injury noted, but pt does not remember event and complains of pain to back of head      37 y.o. Male patient with a PMHx of R sided Bell's Palsey who presents to the ED s/p head injury. He reports being at his baseline of health when he was walking out of [facility] and fell and lost consciousness for unknown period of time. A bystander reported seeing him slip on ice and another reported seeing him have seizure-like activity. A co-worker found him unconscious and called an ambulance, which he was in when he regained consciousness.  He now has a severe headache that he describes as a pounding throughout but worse in the posterior aspect of his head. The pain radiates to his L eye, which is worse when closing his eyes. He also feels nauseous and dizzy since the event. His wife reports repetitive speech. Denies neck pain, blurred vision, diplopia, numbness, chest pain, weakness, fatigue, fever, or recent flu-like symptoms.             History reviewed. No pertinent past medical history.    Past Surgical History:   Procedure Laterality Date    VARIOCOCELE REPAIR  2008    left side       Family History   Problem Relation Age of Onset    Diabetes Father     Heart failure Father        Social History   Substance Use Topics    Smoking status: Current Every Day Smoker     Years: 8.00     Types: Cigarettes    Smokeless tobacco: Never Used    Alcohol use Yes       Review of Systems   Constitutional: Negative for chills, fatigue and fever.   HENT: Negative for sore throat.    Eyes: Positive for pain (left eye). Negative for photophobia and visual disturbance.        -diplopia, blurry vision   Respiratory: Negative for shortness of breath.    Cardiovascular: Negative for chest pain.   Gastrointestinal: Positive for nausea. Negative for abdominal pain,  "constipation, diarrhea and vomiting.   Genitourinary: Negative for dysuria and frequency.   Musculoskeletal: Negative for back pain, joint swelling, neck pain and neck stiffness.   Skin: Negative for rash.   Neurological: Positive for dizziness and headaches. Negative for weakness and numbness.   Hematological: Does not bruise/bleed easily.       Physical Exam   BP (!) 140/85 (BP Location: Left arm, Patient Position: Lying)   Pulse 68   Temp 97.6 °F (36.4 °C) (Oral)   Resp 20   Ht 5' 9" (1.753 m)   Wt 111.1 kg (245 lb)   SpO2 100%   BMI 36.18 kg/m²     Physical Exam    Nursing note and vitals reviewed.  Constitutional: He appears well-developed and well-nourished. He is not diaphoretic. No distress.   HENT:   Head: Normocephalic and atraumatic.   Right Ear: External ear normal.   Left Ear: External ear normal.   Nose: Nose normal.   Eyes: Conjunctivae and EOM are normal. Pupils are equal, round, and reactive to light.   Slight ptosis of the left eyelid.    Neck: Normal range of motion. Neck supple.   Cardiovascular: Normal rate, regular rhythm, normal heart sounds and intact distal pulses. Exam reveals no friction rub.    No murmur heard.  Pulmonary/Chest: Breath sounds normal. No respiratory distress. He has no wheezes. He has no rhonchi. He has no rales.   Abdominal: Soft. Bowel sounds are normal. There is no tenderness. There is no rebound and no guarding.   Musculoskeletal: Normal range of motion.   No cervical spine tenderness to palpation. Full ROM and strength 5/5 of cervical spine.   Neurological: He is alert and oriented to person, place, and time. He has normal strength. No cranial nerve deficit or sensory deficit.   Strength 5/5 and sensation intact throughout bilateral upper and lower extremities. Negative heel to shin. Oriented to person (relative's name), place (hospital name), and time (month and year).    Skin: Skin is warm.   Psychiatric: He has a normal mood and affect.         ED Course "

## 2018-01-17 NOTE — ED NOTES
The patient is asleep and responds to voice. Pt still c/o of severe headache. Family at bedside.     Airway is open and patent.  Respirations with normal effort and rate noted. Explanation of care provided to family and patient. No needs at this time. Will continue to monitor.

## 2018-01-17 NOTE — H&P
[facility]  Neurocritical Care  H&P     Admit Date: 1/17/2018  Service Date: 01/17/2018  Length of Stay: 0    Subjective:     Chief Complaint: SAH (subarachnoid hemorrhage)    History of Present Illness: MR Reynolds is a 37 year old male with a PMH of bell's palsy who presents to [facility] s/p Fall with DX of SDH and SAH and witnesses seizure activity.  He went to work and the last thing that he recalls is walking out the back door of the clinic. He suffered a fall on icy ground outside, which was witnessed by a bystander who reported seizure type activity. He sustained loss of consciousness following the injury. Patient has amnesia to the event and reports waking in an ambulance with a severe diffuse frontal headache en route to the ED. He endorses nausea. He denies vision changes, neck pain/stiffness, rhino/ottorrhea, vomiting, weakness or extremity numbness. He is being admitted to [facility] for a higher level of care.     Hospital Course: 1/17: Admit [facility] CTH: Small regions of hyperdensity within the inferior frontal lobes bilaterally with subtle associated hypodensity compatible with hemorrhagic contusions and edema.Small volume subarachnoid hemorrhage within the adjacent inferior frontal sulci and filling the olfactory recess. Trace probable layering subdural hemorrhage along the tentorium cerebelli    No new subjective & objective note has been filed under this hospital service since the last note was generated.    Assessment/Plan:     Neuro   * SAH (subarachnoid hemorrhage)    - NSGY consulted  - CTH: Small regions of hyperdensity within the inferior frontal lobes bilaterally with subtle associated hypodensity compatible with hemorrhagic contusions and edema.Small volume subarachnoid hemorrhage within the adjacent inferior frontal sulci and filling the olfactory recess. Trace probable layering subdural hemorrhage along the tentorium cerebelli  - Repeat CTH pending   - Neuro checks Q 1  - SBP < 140    - CTA pending   - PT/OT/SP  - NPO   - Vitals Q 1   -HOB 30        Seizure    - Keppra 500 q 12  - possible seizure activity post fall         Subdural hematoma    See above            Prophylaxis:  Venous Thromboembolism: mechanical  Stress Ulcer: NA  Ventilator Pneumonia: not applicable     Activity Orders          None        Full Code    Ronnell Amezcua, NP  Neurocritical Care  [facility]

## 2018-01-18 LAB
ALBUMIN SERPL BCP-MCNC: 3.9 G/DL
ALP SERPL-CCNC: 65 U/L
ALT SERPL W/O P-5'-P-CCNC: 24 U/L
ANION GAP SERPL CALC-SCNC: 10 MMOL/L
AST SERPL-CCNC: 20 U/L
BASOPHILS # BLD AUTO: 0.01 K/UL
BASOPHILS NFR BLD: 0.1 %
BILIRUB SERPL-MCNC: 0.7 MG/DL
BUN SERPL-MCNC: 16 MG/DL
CALCIUM SERPL-MCNC: 9.1 MG/DL
CHLORIDE SERPL-SCNC: 100 MMOL/L
CHOLEST SERPL-MCNC: 172 MG/DL
CHOLEST/HDLC SERPL: 3.2 {RATIO}
CO2 SERPL-SCNC: 25 MMOL/L
CREAT SERPL-MCNC: 0.8 MG/DL
DIFFERENTIAL METHOD: ABNORMAL
EOSINOPHIL # BLD AUTO: 0 K/UL
EOSINOPHIL NFR BLD: 0 %
ERYTHROCYTE [DISTWIDTH] IN BLOOD BY AUTOMATED COUNT: 12.5 %
EST. GFR  (AFRICAN AMERICAN): >60 ML/MIN/1.73 M^2
EST. GFR  (NON AFRICAN AMERICAN): >60 ML/MIN/1.73 M^2
GLUCOSE SERPL-MCNC: 144 MG/DL
HCT VFR BLD AUTO: 41.9 %
HDLC SERPL-MCNC: 54 MG/DL
HDLC SERPL: 31.4 %
HGB BLD-MCNC: 15 G/DL
IMM GRANULOCYTES # BLD AUTO: 0.11 K/UL
IMM GRANULOCYTES NFR BLD AUTO: 0.8 %
LDLC SERPL CALC-MCNC: 102.8 MG/DL
LYMPHOCYTES # BLD AUTO: 0.8 K/UL
LYMPHOCYTES NFR BLD: 5.4 %
MAGNESIUM SERPL-MCNC: 2.1 MG/DL
MCH RBC QN AUTO: 31.6 PG
MCHC RBC AUTO-ENTMCNC: 35.8 G/DL
MCV RBC AUTO: 88 FL
MONOCYTES # BLD AUTO: 0.3 K/UL
MONOCYTES NFR BLD: 2.1 %
NEUTROPHILS # BLD AUTO: 12.8 K/UL
NEUTROPHILS NFR BLD: 91.6 %
NONHDLC SERPL-MCNC: 118 MG/DL
NRBC BLD-RTO: 0 /100 WBC
PHOSPHATE SERPL-MCNC: 2.6 MG/DL
PLATELET # BLD AUTO: 299 K/UL
PMV BLD AUTO: 9.6 FL
POTASSIUM SERPL-SCNC: 3.9 MMOL/L
PROT SERPL-MCNC: 7.7 G/DL
RBC # BLD AUTO: 4.74 M/UL
SODIUM SERPL-SCNC: 135 MMOL/L
TRIGL SERPL-MCNC: 76 MG/DL
WBC # BLD AUTO: 14.02 K/UL

## 2018-01-18 PROCEDURE — 85025 COMPLETE CBC W/AUTO DIFF WBC: CPT

## 2018-01-18 PROCEDURE — 25000003 PHARM REV CODE 250: Performed by: STUDENT IN AN ORGANIZED HEALTH CARE EDUCATION/TRAINING PROGRAM

## 2018-01-18 PROCEDURE — 20600001 HC STEP DOWN PRIVATE ROOM

## 2018-01-18 PROCEDURE — 25000003 PHARM REV CODE 250: Performed by: PHYSICIAN ASSISTANT

## 2018-01-18 PROCEDURE — 94761 N-INVAS EAR/PLS OXIMETRY MLT: CPT

## 2018-01-18 PROCEDURE — 63600175 PHARM REV CODE 636 W HCPCS: Performed by: PSYCHIATRY & NEUROLOGY

## 2018-01-18 PROCEDURE — 83735 ASSAY OF MAGNESIUM: CPT

## 2018-01-18 PROCEDURE — 25000003 PHARM REV CODE 250: Performed by: PSYCHIATRY & NEUROLOGY

## 2018-01-18 PROCEDURE — 63600175 PHARM REV CODE 636 W HCPCS: Performed by: PHYSICIAN ASSISTANT

## 2018-01-18 PROCEDURE — 99232 SBSQ HOSP IP/OBS MODERATE 35: CPT | Mod: ,,, | Performed by: NEUROLOGICAL SURGERY

## 2018-01-18 PROCEDURE — 80061 LIPID PANEL: CPT

## 2018-01-18 PROCEDURE — 80053 COMPREHEN METABOLIC PANEL: CPT

## 2018-01-18 PROCEDURE — 84100 ASSAY OF PHOSPHORUS: CPT

## 2018-01-18 PROCEDURE — 99233 SBSQ HOSP IP/OBS HIGH 50: CPT | Mod: ,,, | Performed by: PSYCHIATRY & NEUROLOGY

## 2018-01-18 RX ORDER — LEVETIRACETAM 500 MG/1
500 TABLET ORAL 2 TIMES DAILY
Status: DISCONTINUED | OUTPATIENT
Start: 2018-01-18 | End: 2018-01-20 | Stop reason: HOSPADM

## 2018-01-18 RX ORDER — HEPARIN SODIUM 5000 [USP'U]/ML
5000 INJECTION, SOLUTION INTRAVENOUS; SUBCUTANEOUS EVERY 8 HOURS
Status: DISCONTINUED | OUTPATIENT
Start: 2018-01-18 | End: 2018-01-20 | Stop reason: HOSPADM

## 2018-01-18 RX ORDER — POTASSIUM CHLORIDE 20 MEQ/15ML
40 SOLUTION ORAL
Status: DISCONTINUED | OUTPATIENT
Start: 2018-01-18 | End: 2018-01-20 | Stop reason: HOSPADM

## 2018-01-18 RX ORDER — HYDROCODONE BITARTRATE AND ACETAMINOPHEN 5; 325 MG/1; MG/1
1 TABLET ORAL EVERY 6 HOURS PRN
Status: DISCONTINUED | OUTPATIENT
Start: 2018-01-18 | End: 2018-01-18

## 2018-01-18 RX ORDER — LANOLIN ALCOHOL/MO/W.PET/CERES
800 CREAM (GRAM) TOPICAL
Status: DISCONTINUED | OUTPATIENT
Start: 2018-01-18 | End: 2018-01-20 | Stop reason: HOSPADM

## 2018-01-18 RX ORDER — SODIUM,POTASSIUM PHOSPHATES 280-250MG
2 POWDER IN PACKET (EA) ORAL
Status: DISCONTINUED | OUTPATIENT
Start: 2018-01-18 | End: 2018-01-20 | Stop reason: HOSPADM

## 2018-01-18 RX ORDER — LEVETIRACETAM 500 MG/1
500 TABLET ORAL 2 TIMES DAILY
Status: DISCONTINUED | OUTPATIENT
Start: 2018-01-18 | End: 2018-01-18

## 2018-01-18 RX ORDER — AMOXICILLIN 250 MG
1 CAPSULE ORAL DAILY PRN
Status: DISCONTINUED | OUTPATIENT
Start: 2018-01-18 | End: 2018-01-20

## 2018-01-18 RX ORDER — HYDROCODONE BITARTRATE AND ACETAMINOPHEN 10; 325 MG/1; MG/1
1 TABLET ORAL EVERY 6 HOURS PRN
Status: DISCONTINUED | OUTPATIENT
Start: 2018-01-18 | End: 2018-01-19

## 2018-01-18 RX ADMIN — FENTANYL CITRATE 12.5 MCG: 50 INJECTION INTRAMUSCULAR; INTRAVENOUS at 04:01

## 2018-01-18 RX ADMIN — ACETAMINOPHEN 650 MG: 325 TABLET ORAL at 09:01

## 2018-01-18 RX ADMIN — FENTANYL CITRATE 12.5 MCG: 50 INJECTION INTRAMUSCULAR; INTRAVENOUS at 07:01

## 2018-01-18 RX ADMIN — SODIUM CHLORIDE TAB 1 GM 2 G: 1 TAB at 08:01

## 2018-01-18 RX ADMIN — FENTANYL CITRATE 12.5 MCG: 50 INJECTION INTRAMUSCULAR; INTRAVENOUS at 06:01

## 2018-01-18 RX ADMIN — HEPARIN SODIUM 5000 UNITS: 5000 INJECTION, SOLUTION INTRAVENOUS; SUBCUTANEOUS at 08:01

## 2018-01-18 RX ADMIN — NICARDIPINE HYDROCHLORIDE 2.5 MG/HR: 0.2 INJECTION, SOLUTION INTRAVENOUS at 06:01

## 2018-01-18 RX ADMIN — FENTANYL CITRATE 12.5 MCG: 50 INJECTION INTRAMUSCULAR; INTRAVENOUS at 08:01

## 2018-01-18 RX ADMIN — ACETAMINOPHEN 650 MG: 325 TABLET ORAL at 03:01

## 2018-01-18 RX ADMIN — HYDROCODONE BITARTRATE AND ACETAMINOPHEN 1 TABLET: 5; 325 TABLET ORAL at 05:01

## 2018-01-18 RX ADMIN — FENTANYL CITRATE 12.5 MCG: 50 INJECTION INTRAMUSCULAR; INTRAVENOUS at 11:01

## 2018-01-18 RX ADMIN — HYDROCODONE BITARTRATE AND ACETAMINOPHEN 1 TABLET: 5; 325 TABLET ORAL at 10:01

## 2018-01-18 RX ADMIN — LEVETIRACETAM 500 MG: 500 TABLET ORAL at 08:01

## 2018-01-18 RX ADMIN — SODIUM CHLORIDE TAB 1 GM 2 G: 1 TAB at 01:01

## 2018-01-18 RX ADMIN — FENTANYL CITRATE 12.5 MCG: 50 INJECTION INTRAMUSCULAR; INTRAVENOUS at 12:01

## 2018-01-18 RX ADMIN — HEPARIN SODIUM 5000 UNITS: 5000 INJECTION, SOLUTION INTRAVENOUS; SUBCUTANEOUS at 10:01

## 2018-01-18 RX ADMIN — FENTANYL CITRATE 12.5 MCG: 50 INJECTION INTRAMUSCULAR; INTRAVENOUS at 01:01

## 2018-01-18 RX ADMIN — LEVETIRACETAM 500 MG: 500 TABLET ORAL at 09:01

## 2018-01-18 NOTE — PLAN OF CARE
Problem: Patient Care Overview  Goal: Plan of Care Review  Outcome: Ongoing (interventions implemented as appropriate)  Patient arrived to floor from Neuro ICU. Patient AAOx4. VSS. Patient calm and cooperative. Oriented to room. Patient remains free of falls. Family at patient bedside.

## 2018-01-18 NOTE — PROGRESS NOTES
[facility]  Neurosurgery  Progress Note    Subjective:     History of Present Illness: Thuan Reynolds is a 37 y.o. male without significant PMH who [place] [facility] [place] witnessed fall outside of his physical therapy clinic. + LOC of unknown duration. Possible witnessed seizure activity by apparent bystander who is not at bedside. CT head on arrival shows bifrontal hemorrhagic contusions. No witnessed seizure activity while in ED. Currently c/o severe frontal HA and photophobia. No anticoagulant use.    Post-Op Info:  * No surgery found *         Prescriptions Prior to Admission   Medication Sig Dispense Refill Last Dose    predniSONE (DELTASONE) 5 MG tablet 6 PO BID x 5 days  4 PO BID x 1 day  3 PO BID x 1 day  2 PO BID x1 day  1 PO BID x 1 day    1 PO x 1 day 90 tablet 0 Not Taking       Review of patient's allergies indicates:  No Known Allergies    Past Medical History:   Diagnosis Date    Seizure      Past Surgical History:   Procedure Laterality Date    VARIOCOCELE REPAIR  2008    left side     Family History     Problem Relation (Age of Onset)    Diabetes Father    Heart failure Father        Social History Main Topics    Smoking status: Current Every Day Smoker     Years: 8.00     Types: Cigarettes    Smokeless tobacco: Never Used    Alcohol use Yes    Drug use: No    Sexual activity: Yes     Partners: Female     Review of Systems     GCS 15.    Constitutional: no fever, chills or night sweats. No changes in weight   Eyes: no visual changes   ENT: no nasal congestion or sore throat   Respiratory: no cough or shortness of breath   Cardiovascular: no chest pain or palpitations   Gastrointestinal: no nausea or vomiting   Genitourinary: no hematuria or dysuria   Integument/Breast: no rash or pruritis   Hematologic/Lymphatic: no easy bruising or lymphadenopathy   Musculoskeletal: no arthralgias or myalgias.   Neurological: no seizures or tremors   Behavioral/Psych: no auditory  or visual hallucinations   Endocrine: no heat or cold intolerance   Objective:     Weight: 103.6 kg (228 lb 6.3 oz)  Body mass index is 33.73 kg/m².  Vital Signs (Most Recent):  Temp: 98.3 °F (36.8 °C) (01/18/18 0700)  Pulse: (!) 54 (01/18/18 0818)  Resp: 18 (01/18/18 0818)  BP: (!) 103/59 (01/18/18 0818)  SpO2: 95 % (01/18/18 0818) Vital Signs (24h Range):  Temp:  [97.6 °F (36.4 °C)-98.7 °F (37.1 °C)] 98.3 °F (36.8 °C)  Pulse:  [] 54  Resp:  [14-22] 18  SpO2:  [92 %-100 %] 95 %  BP: (102-195)/(51-89) 103/59       Date 01/18/18 0700 - 01/19/18 0659   Shift 3527-6905 9082-5037 4374-7303 24 Hour Total   I  N  T  A  K  E   I.V.  (mL/kg) 45  (0.4)   45  (0.4)    Shift Total  (mL/kg) 45  (0.4)   45  (0.4)   O  U  T  P  U  T   Urine  (mL/kg/hr) 300   300    Shift Total  (mL/kg) 300  (2.9)   300  (2.9)   Weight (kg) 103.6 103.6 103.6 103.6                        Neurosurgery Physical Exam    General: well developed, well nourished, no distress.   Head: normocephalic, atraumatic  Neurologic: Alert and oriented. Thought content appropriate.  GCS: Motor: 6/Verbal: 5/Eyes: 4 GCS Total: 15  Mental Status: Awake, Alert, Oriented x 4  Language: No aphasia  Speech: No dysarthria  Cranial nerves: face symmetric with L periorbital ecchymosis and mild L ptosis, tongue midline, CN II-XII grossly intact.   Eyes: pupils equal, round, reactive to light with accomodation, EOMI.  Pulmonary: normal respirations, no signs of respiratory distress  Abdomen: soft, non-distended, not tender to palpation  Sensory: intact to light touch throughout    Motor Strength: Moves all extremities spontaneously with good tone.  Full strength upper and lower extremities. No abnormal movements seen.     DTR's: 2 + and symmetric in UE and LE  Pronator Drift: no drift noted  Finger-to-nose: Intact bilaterally  Eng: absent  Clonus: absent  Babinski: absent  Pulses: 2+ and symmetric radial and dorsalis pedis.  Skin: Skin is warm, dry and  intact.    Significant Labs:    Recent Labs  Lab 01/17/18  1247 01/18/18  0403   * 144*    135*   K 4.2 3.9    100   CO2 27 25   BUN 14 16   CREATININE 0.8 0.8   CALCIUM 9.1 9.1   MG  --  2.1       Recent Labs  Lab 01/17/18  1247 01/18/18  0403   WBC 21.96* 14.02*   HGB 15.9 15.0   HCT 43.8 41.9    299       Recent Labs  Lab 01/17/18  1247   INR 1.0     Microbiology Results (last 7 days)     ** No results found for the last 168 hours. **          Significant Diagnostics:  CT head 01/18/2018 reviewed.  Small regions of hyperdensity within the inferior frontal lobes bilaterally with subtle associated hypodensity compatible with hemorrhagic contusions and edema.    Small volume subarachnoid hemorrhage within the adjacent inferior frontal sulci and filling the olfactory recess.    Trace probable layering subdural hemorrhage along the tentorium cerebelli    Assessment/Plan:     * SAH (subarachnoid hemorrhage)    37 y.o. male with traumatic bifrontal hemorrhagic contusions and tentorium SDH.    --Continue care per primary team.  --Continue levetiracetam 500 bid for 7 days for early PTS PPX in setting of mild closed TBI.  --Recommend ICU level care for additional day, tomorrow pt likely can be stepped down to neurosurgical stepdown unit under neurosurgery service.  --We will continue to monitor closely, please contact us with any questions or concerns.             Rubén Lozano MD  Neurosurgery  [facility]

## 2018-01-18 NOTE — PLAN OF CARE
Problem: Patient Care Overview  Goal: Plan of Care Review  Outcome: Ongoing (interventions implemented as appropriate)  Plan of care reviewed with patient and patient's spouse at 1200 and verbalized understanding of the POC. Patient off cardene this morning , BP tolerating well. Still with complains of head pain, PRN pain medications given. On regular diet. NS increased to 100ml/hr. Patient with transfer orders, still waiting for room availability. Questions and concerns addressed. Please se flow sheet for detailed nursing assessment and VS. Will continue to monitor.

## 2018-01-18 NOTE — SUBJECTIVE & OBJECTIVE
Interval History:  >4 elements OR status of 3 inpatient conditions  No significant clinical neurological dysfunction. No evidence of CSF leak. CT head with bifrontal hemorrhagic contusions at the base of the frontal lobe. CT C -spine OK.Mild change on the frontal lobe. WBC elevated.  Review of Systems   Constitutional: Negative.    HENT: Negative.    Eyes: Negative.    Respiratory: Negative.    Cardiovascular: Negative.    Gastrointestinal: Negative.    Endocrine: Negative.    Genitourinary: Negative.    Musculoskeletal: Negative.      2 systems OR Unable to obtain a complete ROS due to level of consciousness.  Objective:     Vitals:  Temp: 98.3 °F (36.8 °C)  Pulse: (!) 57  Rhythm: sinus bradycardia  BP: 127/69  MAP (mmHg): 90  Resp: 16  SpO2: (!) 94 %  O2 Device (Oxygen Therapy): room air    Temp  Min: 98.3 °F (36.8 °C)  Max: 98.7 °F (37.1 °C)  Pulse  Min: 49  Max: 114  BP  Min: 100/54  Max: 195/84  MAP (mmHg)  Min: 73  Max: 121  Resp  Min: 14  Max: 25  SpO2  Min: 92 %  Max: 98 %    01/17 0701 - 01/18 0700  In: 375.7 [I.V.:375.7]  Out: 1100 [Urine:1100]           Physical Exam  Unable to test gait due to level of consciousness.    General   HEENT: L-orbital edema.  Chest Heart RRR / Lungs Clear to auscultation  Adbomen: Soft nontender + BS  Extremities: OK distal pulses.  Skin: UK  Neurological Exam:  MS; Alert, oriented to P/T/P/R, No language abnormalities. Normal mood.  CN: II-XII UK  Motor: LUE  5 /5 / RUE 5/5  Tone normal bilaterally             LLE 5 /5 /  RLE  5/5  Tone normal bilaterally  Sensory: LT/PP/T/ Vibration UK                 Complex sensory modalities: not tested  DTR:  normal throughout.  Coordination /Fine motor:   Gait: Not tested.  Meningeal signs: Absent    Medications:  Continuous  sodium chloride 0.9% Last Rate: 100 mL/hr at 01/18/18 1300   Scheduled  heparin (porcine) 5,000 Units Q8H   levETIRAcetam 500 mg BID   sodium chloride 2 g TID   PRN  acetaminophen 650 mg Q4H PRN   fentaNYL 12.5  mcg Q2H PRN   hydrocodone-acetaminophen 5-325mg 1 tablet Q6H PRN   magnesium oxide 800 mg PRN   magnesium oxide 800 mg PRN   ondansetron 4 mg Q8H PRN   potassium chloride 10% 40 mEq PRN   potassium chloride 10% 40 mEq PRN   potassium chloride 10% 40 mEq PRN   potassium, sodium phosphates 2 packet PRN   potassium, sodium phosphates 2 packet PRN   potassium, sodium phosphates 2 packet PRN   senna-docusate 8.6-50 mg 1 tablet Daily PRN     Today I personally reviewed pertinent medications, lines/drains/airways, imaging, cardiology, lab results, microbiology results, notably:    Diet  Diet Adult Regular  Diet Adult Regular  CMP:   Recent Labs  Lab 01/18/18  0403   CALCIUM 9.1   ALBUMIN 3.9   PROT 7.7   *   K 3.9   CO2 25      BUN 16   CREATININE 0.8   ALKPHOS 65   ALT 24   AST 20   BILITOT 0.7      BMP:   Recent Labs  Lab 01/18/18  0403   *   K 3.9      CO2 25   BUN 16   CREATININE 0.8   CALCIUM 9.1      CBC:   Recent Labs  Lab 01/18/18  0403   WBC 14.02*   RBC 4.74   HGB 15.0   HCT 41.9      MCV 88   MCH 31.6*   MCHC 35.8      Lipid Panel:   Recent Labs  Lab 01/18/18  0411   CHOL 172   LDLCALC 102.8   HDL 54   TRIG 76      Coagulation:   Recent Labs  Lab 01/17/18  1247   INR 1.0      Platelet Aggregation Study: No results for input(s): PLTAGG, PLTAGINTERP, PLTAGREGLACO, ADPPLTAGGREG in the last 168 hours.  Hgb A1C: No results for input(s): HGBA1C in the last 168 hours.  TSH: No results for input(s): TSH in the last 168 hours.

## 2018-01-18 NOTE — PROGRESS NOTES
[place] [facility] [place] [facility] [place] transport. Patient connected to cardiac monitor and assessment complete per LUCA Alejandra RN    Type of Stroke: SAH    Patients current symptoms include: headache    Past Medical History:   Diagnosis Date    Seizure        NCC notified of patient arrival.

## 2018-01-18 NOTE — PROGRESS NOTES
[facility]  Neurocritical Care  Progress Note    Admit Date: 1/17/2018  Service Date: 01/18/2018  Length of Stay: 1    Subjective:     Chief Complaint: SAH (subarachnoid hemorrhage)    History of Present Illness: MR Reynolds is a 37 year old male with a PMH of bell's palsy who presents to [facility] s/p Fall with DX of SDH and SAH and witnesses seizure activity.  He went to work and the last thing that he recalls is walking out the back door of the clinic. He suffered a fall on icy ground outside, which was witnessed by a bystander who reported seizure type activity. He sustained loss of consciousness following the injury. Patient has amnesia to the event and reports waking in an ambulance with a severe diffuse frontal headache en route to the ED. He endorses nausea. He denies vision changes, neck pain/stiffness, rhino/ottorrhea, vomiting, weakness or extremity numbness. He is being admitted to [facility] for a higher level of care.     Hospital Course: 1/17: Admit [facility] CTH: Small regions of hyperdensity within the inferior frontal lobes bilaterally with subtle associated hypodensity compatible with hemorrhagic contusions and edema.Small volume subarachnoid hemorrhage within the adjacent inferior frontal sulci and filling the olfactory recess. Trace probable layering subdural hemorrhage along the tentorium cerebelli  1/18: No significant clinical neurological dysfunction. No evidence of CSF leak. CT head with bifrontal hemorrhagic contusions at the base of the frontal lobe. CT C -spine OK.Mild change on the frontal lobe. WBC elevated.  `/`8: No significant clinical neurological dysfunction. No evidence of CSF leak. CT head with bifrontal hemorrhagic contusions at the base of the frontal lobe. CT C -spine OK.Mild change on the frontal lobe. WBC elevated.    Interval History:  >4 elements OR status of 3 inpatient conditions  No significant clinical neurological dysfunction. No evidence of CSF leak. CT head  with bifrontal hemorrhagic contusions at the base of the frontal lobe. CT C -spine OK.Mild change on the frontal lobe. WBC elevated.  Review of Systems   Constitutional: Negative.    HENT: Negative.    Eyes: Negative.    Respiratory: Negative.    Cardiovascular: Negative.    Gastrointestinal: Negative.    Endocrine: Negative.    Genitourinary: Negative.    Musculoskeletal: Negative.      2 systems OR Unable to obtain a complete ROS due to level of consciousness.  Objective:     Vitals:  Temp: 98.3 °F (36.8 °C)  Pulse: (!) 57  Rhythm: sinus bradycardia  BP: 127/69  MAP (mmHg): 90  Resp: 16  SpO2: (!) 94 %  O2 Device (Oxygen Therapy): room air    Temp  Min: 98.3 °F (36.8 °C)  Max: 98.7 °F (37.1 °C)  Pulse  Min: 49  Max: 114  BP  Min: 100/54  Max: 195/84  MAP (mmHg)  Min: 73  Max: 121  Resp  Min: 14  Max: 25  SpO2  Min: 92 %  Max: 98 %    01/17 0701 - 01/18 0700  In: 375.7 [I.V.:375.7]  Out: 1100 [Urine:1100]           Physical Exam  Unable to test gait due to level of consciousness.    General   HEENT: L-orbital edema.  Chest Heart RRR / Lungs Clear to auscultation  Adbomen: Soft nontender + BS  Extremities: OK distal pulses.  Skin: UK  Neurological Exam:  MS; Alert, oriented to P/T/P/R, No language abnormalities. Normal mood.  CN: II-XII UK  Motor: LUE  5 /5 / RUE 5/5  Tone normal bilaterally             LLE 5 /5 /  RLE  5/5  Tone normal bilaterally  Sensory: LT/PP/T/ Vibration                  Complex sensory modalities: not tested  DTR:  normal throughout.  Coordination /Fine motor: UK  Gait: Not tested.  Meningeal signs: Absent    Medications:  Continuous  sodium chloride 0.9% Last Rate: 100 mL/hr at 01/18/18 1300   Scheduled  heparin (porcine) 5,000 Units Q8H   levETIRAcetam 500 mg BID   sodium chloride 2 g TID   PRN  acetaminophen 650 mg Q4H PRN   fentaNYL 12.5 mcg Q2H PRN   hydrocodone-acetaminophen 5-325mg 1 tablet Q6H PRN   magnesium oxide 800 mg PRN   magnesium oxide 800 mg PRN   ondansetron 4 mg Q8H PRN    potassium chloride 10% 40 mEq PRN   potassium chloride 10% 40 mEq PRN   potassium chloride 10% 40 mEq PRN   potassium, sodium phosphates 2 packet PRN   potassium, sodium phosphates 2 packet PRN   potassium, sodium phosphates 2 packet PRN   senna-docusate 8.6-50 mg 1 tablet Daily PRN     Today I personally reviewed pertinent medications, lines/drains/airways, imaging, cardiology, lab results, microbiology results, notably:    Diet  Diet Adult Regular  Diet Adult Regular  CMP:   Recent Labs  Lab 01/18/18  0403   CALCIUM 9.1   ALBUMIN 3.9   PROT 7.7   *   K 3.9   CO2 25      BUN 16   CREATININE 0.8   ALKPHOS 65   ALT 24   AST 20   BILITOT 0.7      BMP:   Recent Labs  Lab 01/18/18  0403   *   K 3.9      CO2 25   BUN 16   CREATININE 0.8   CALCIUM 9.1      CBC:   Recent Labs  Lab 01/18/18  0403   WBC 14.02*   RBC 4.74   HGB 15.0   HCT 41.9      MCV 88   MCH 31.6*   MCHC 35.8      Lipid Panel:   Recent Labs  Lab 01/18/18  0411   CHOL 172   LDLCALC 102.8   HDL 54   TRIG 76      Coagulation:   Recent Labs  Lab 01/17/18  1247   INR 1.0      Platelet Aggregation Study: No results for input(s): PLTAGG, PLTAGINTERP, PLTAGREGLACO, ADPPLTAGGREG in the last 168 hours.  Hgb A1C: No results for input(s): HGBA1C in the last 168 hours.  TSH: No results for input(s): TSH in the last 168 hours.            Assessment/Plan:     Neuro   * SAH (subarachnoid hemorrhage)    - NSGY consulted  - CTH: Small regions of hyperdensity within the inferior frontal lobes bilaterally with subtle associated hypodensity compatible with hemorrhagic contusions and edema.Small volume subarachnoid hemorrhage within the adjacent inferior frontal sulci and filling the olfactory recess. Trace probable layering subdural hemorrhage along the tentorium cerebelli  - Repeat CTH pending   - Neuro checks Q 1  - SBP < 140   - CTA pending   - PT/OT/SP  - NPO   - Vitals Q 1   -HOB 30        Seizure    - Keppra 500 q 12  - possible seizure  activity post fall         Subdural hematoma    See above            Active Problem List:   1.[facility] after fall with TBI  2. Secondary bifrontal hemorrhagic contusions  3. Hx Bell's palsy.  4. Traumatic SAH.   5. Piost traumatic SZ  6. Small nondepressed left parietal calvarial fracture again noted.        Assessment / Plan:     Neuro:  -Keppra 500mg q 12hrs and keep for now  -Telanol PRN  -PT/OT  -EEG 1 hr.  -Norco 5/325 q 4hrs PRN  Resp:  RA  IS  CV: Keep SBP <=160 mmHg  -TTE  -12 lead ECG  -Cardene  IVD off.   IVF/nutrition/Renal/GI:  -PO diet passed swallowing  -NS at 100cc/hr IVD  -BR  Hem / ID: [id number]  UK  Endo:  -UK  Prophylaxis:  SC Heparin prophylaxis    SCDs  Advance Directives and Disposition:    Full Code. Consider transferring him to NSU SDH.        Uninterrupted level 3  Follow-up /Counseling Time (not including procedures):  = 35 min          Activity Orders          None        Full Code    Amilcar Roach MD  Neurocritical Care  [facility]

## 2018-01-18 NOTE — ED NOTES
Patient and family updated that repeat scan will be at 0300 this morning. NCC aware that patients headache remains a 9/10, TAMELA Reynaga stated to give patient 12.5mcg of fentanyl now.

## 2018-01-18 NOTE — PROGRESS NOTES
Transferred patient to room 1025 via wheelchair. No acute events during the transfer. Receiving TARSHA Braden notified.

## 2018-01-18 NOTE — SUBJECTIVE & OBJECTIVE
Prescriptions Prior to Admission   Medication Sig Dispense Refill Last Dose    predniSONE (DELTASONE) 5 MG tablet 6 PO BID x 5 days  4 PO BID x 1 day  3 PO BID x 1 day  2 PO BID x1 day  1 PO BID x 1 day    1 PO x 1 day 90 tablet 0 Not Taking       Review of patient's allergies indicates:  No Known Allergies    Past Medical History:   Diagnosis Date    Seizure      Past Surgical History:   Procedure Laterality Date    VARIOCOCELE REPAIR  2008    left side     Family History     Problem Relation (Age of Onset)    Diabetes Father    Heart failure Father        Social History Main Topics    Smoking status: Current Every Day Smoker     Years: 8.00     Types: Cigarettes    Smokeless tobacco: Never Used    Alcohol use Yes    Drug use: No    Sexual activity: Yes     Partners: Female     Review of Systems     GCS 15.    Constitutional: no fever, chills or night sweats. No changes in weight   Eyes: no visual changes   ENT: no nasal congestion or sore throat   Respiratory: no cough or shortness of breath   Cardiovascular: no chest pain or palpitations   Gastrointestinal: no nausea or vomiting   Genitourinary: no hematuria or dysuria   Integument/Breast: no rash or pruritis   Hematologic/Lymphatic: no easy bruising or lymphadenopathy   Musculoskeletal: no arthralgias or myalgias.   Neurological: no seizures or tremors   Behavioral/Psych: no auditory or visual hallucinations   Endocrine: no heat or cold intolerance   Objective:     Weight: 103.6 kg (228 lb 6.3 oz)  Body mass index is 33.73 kg/m².  Vital Signs (Most Recent):  Temp: 98.3 °F (36.8 °C) (01/18/18 0700)  Pulse: (!) 54 (01/18/18 0818)  Resp: 18 (01/18/18 0818)  BP: (!) 103/59 (01/18/18 0818)  SpO2: 95 % (01/18/18 0818) Vital Signs (24h Range):  Temp:  [97.6 °F (36.4 °C)-98.7 °F (37.1 °C)] 98.3 °F (36.8 °C)  Pulse:  [] 54  Resp:  [14-22] 18  SpO2:  [92 %-100 %] 95 %  BP: (102-195)/(51-89) 103/59       Date 01/18/18 0700 - 01/19/18 0659   Shift 2467-5538  7851-8190 0983-0293 24 Hour Total   I  N  T  A  K  E   I.V.  (mL/kg) 45  (0.4)   45  (0.4)    Shift Total  (mL/kg) 45  (0.4)   45  (0.4)   O  U  T  P  U  T   Urine  (mL/kg/hr) 300   300    Shift Total  (mL/kg) 300  (2.9)   300  (2.9)   Weight (kg) 103.6 103.6 103.6 103.6                        Neurosurgery Physical Exam    General: well developed, well nourished, no distress.   Head: normocephalic, atraumatic  Neurologic: Alert and oriented. Thought content appropriate.  GCS: Motor: 6/Verbal: 5/Eyes: 4 GCS Total: 15  Mental Status: Awake, Alert, Oriented x 4  Language: No aphasia  Speech: No dysarthria  Cranial nerves: face symmetric with L periorbital ecchymosis and mild L ptosis, tongue midline, CN II-XII grossly intact.   Eyes: pupils equal, round, reactive to light with accomodation, EOMI.  Pulmonary: normal respirations, no signs of respiratory distress  Abdomen: soft, non-distended, not tender to palpation  Sensory: intact to light touch throughout    Motor Strength: Moves all extremities spontaneously with good tone.  Full strength upper and lower extremities. No abnormal movements seen.     DTR's: 2 + and symmetric in UE and LE  Pronator Drift: no drift noted  Finger-to-nose: Intact bilaterally  Eng: absent  Clonus: absent  Babinski: absent  Pulses: 2+ and symmetric radial and dorsalis pedis.  Skin: Skin is warm, dry and intact.    Significant Labs:    Recent Labs  Lab 01/17/18  1247 01/18/18  0403   * 144*    135*   K 4.2 3.9    100   CO2 27 25   BUN 14 16   CREATININE 0.8 0.8   CALCIUM 9.1 9.1   MG  --  2.1       Recent Labs  Lab 01/17/18  1247 01/18/18  0403   WBC 21.96* 14.02*   HGB 15.9 15.0   HCT 43.8 41.9    299       Recent Labs  Lab 01/17/18  1247   INR 1.0     Microbiology Results (last 7 days)     ** No results found for the last 168 hours. **          Significant Diagnostics:  CT head 01/18/2018 reviewed.  Small regions of hyperdensity within the inferior frontal  lobes bilaterally with subtle associated hypodensity compatible with hemorrhagic contusions and edema.    Small volume subarachnoid hemorrhage within the adjacent inferior frontal sulci and filling the olfactory recess.    Trace probable layering subdural hemorrhage along the tentorium cerebelli

## 2018-01-18 NOTE — ASSESSMENT & PLAN NOTE
37 y.o. male with traumatic bifrontal hemorrhagic contusions and tentorium SDH.    --Continue care per primary team.  --Continue levetiracetam 500 bid for 7 days for early PTS PPX in setting of mild closed TBI.  --Recommend ICU level care for additional day, tomorrow pt likely can be stepped down to neurosurgical stepdown unit under neurosurgery service.  --We will continue to monitor closely, please contact us with any questions or concerns.

## 2018-01-18 NOTE — PLAN OF CARE
[facility] [address] - [place] - [address]  [address]  [place] [address]  Phone: [phone] Fax: [phone]    This CM spoke with patient spouse at bedside.       01/18/18 1406   Discharge Assessment   Assessment Type Discharge Planning Assessment   Confirmed/corrected address and phone number on facesheet? Yes   Assessment information obtained from? Caregiver   Expected Length of Stay (days) 3   Communicated expected length of stay with patient/caregiver yes   Prior to hospitilization cognitive status: Alert/Oriented   Prior to hospitalization functional status: Independent   Current cognitive status: Alert/Oriented   Current Functional Status: Needs Assistance   Facility Arrived From: (ambulance from [facility])   Lives With spouse  (spouse Arleth Reinoso [phone])   Able to Return to Prior Arrangements yes   Is patient able to care for self after discharge? Yes   Who are your caregiver(s) and their phone number(s)? (spouse Arleth Reynolds [phone])   Patient's perception of discharge disposition home or selfcare   Readmission Within The Last 30 Days no previous admission in last 30 days   Patient currently being followed by outpatient case management? No   Patient currently receives any other outside agency services? No   Equipment Currently Used at Home none   Do you have any problems affording any of your prescribed medications? No   Is the patient taking medications as prescribed? yes   Does the patient have transportation home? Yes   Does the patient receive services at the [facility]? No   Discharge Plan A Home   Discharge Plan B Home with family   Patient/Family In Agreement With Plan yes     Berna Sorto RN/BSN/JOSELUIS  [phone]  [facility]

## 2018-01-18 NOTE — PLAN OF CARE
Problem: Patient Care Overview  Goal: Plan of Care Review  Outcome: Ongoing (interventions implemented as appropriate)  No acute events occurred throughout the shift. See flowsheets for assessments and VS. Plan of care reviewed with Thuan Reynolds and Thuan Reynolds's family. All questions answered in turn. Pt arrived to unit at 0355. Pt oriented to room and call light within reach. Pt complaining of a severe headache. Pt progressing towards goals as noted. Will continue to monitor.

## 2018-01-19 LAB
ALBUMIN SERPL BCP-MCNC: 3.4 G/DL
ALP SERPL-CCNC: 54 U/L
ALT SERPL W/O P-5'-P-CCNC: 20 U/L
ANION GAP SERPL CALC-SCNC: 9 MMOL/L
AST SERPL-CCNC: 16 U/L
BILIRUB SERPL-MCNC: 0.5 MG/DL
BUN SERPL-MCNC: 21 MG/DL
CALCIUM SERPL-MCNC: 8.9 MG/DL
CHLORIDE SERPL-SCNC: 106 MMOL/L
CO2 SERPL-SCNC: 22 MMOL/L
CREAT SERPL-MCNC: 0.7 MG/DL
EST. GFR  (AFRICAN AMERICAN): >60 ML/MIN/1.73 M^2
EST. GFR  (NON AFRICAN AMERICAN): >60 ML/MIN/1.73 M^2
GLUCOSE SERPL-MCNC: 105 MG/DL
MAGNESIUM SERPL-MCNC: 2.2 MG/DL
PHOSPHATE SERPL-MCNC: 2.3 MG/DL
POTASSIUM SERPL-SCNC: 4.1 MMOL/L
PROT SERPL-MCNC: 6.8 G/DL
SODIUM SERPL-SCNC: 137 MMOL/L

## 2018-01-19 PROCEDURE — 25000003 PHARM REV CODE 250: Performed by: STUDENT IN AN ORGANIZED HEALTH CARE EDUCATION/TRAINING PROGRAM

## 2018-01-19 PROCEDURE — 25000003 PHARM REV CODE 250: Performed by: PSYCHIATRY & NEUROLOGY

## 2018-01-19 PROCEDURE — 20600001 HC STEP DOWN PRIVATE ROOM

## 2018-01-19 PROCEDURE — 63600175 PHARM REV CODE 636 W HCPCS: Performed by: STUDENT IN AN ORGANIZED HEALTH CARE EDUCATION/TRAINING PROGRAM

## 2018-01-19 PROCEDURE — 84100 ASSAY OF PHOSPHORUS: CPT

## 2018-01-19 PROCEDURE — 63600175 PHARM REV CODE 636 W HCPCS: Performed by: PHYSICIAN ASSISTANT

## 2018-01-19 PROCEDURE — 80053 COMPREHEN METABOLIC PANEL: CPT

## 2018-01-19 PROCEDURE — 83735 ASSAY OF MAGNESIUM: CPT

## 2018-01-19 PROCEDURE — 25000003 PHARM REV CODE 250: Performed by: PHYSICIAN ASSISTANT

## 2018-01-19 PROCEDURE — 36415 COLL VENOUS BLD VENIPUNCTURE: CPT

## 2018-01-19 RX ORDER — FENTANYL CITRATE 50 UG/ML
25 INJECTION, SOLUTION INTRAMUSCULAR; INTRAVENOUS
Status: DISCONTINUED | OUTPATIENT
Start: 2018-01-19 | End: 2018-01-20 | Stop reason: HOSPADM

## 2018-01-19 RX ORDER — FENTANYL CITRATE 50 UG/ML
25 INJECTION, SOLUTION INTRAMUSCULAR; INTRAVENOUS ONCE
Status: COMPLETED | OUTPATIENT
Start: 2018-01-19 | End: 2018-01-19

## 2018-01-19 RX ORDER — BUTALBITAL, ACETAMINOPHEN AND CAFFEINE 50; 325; 40 MG/1; MG/1; MG/1
1 TABLET ORAL EVERY 4 HOURS PRN
Status: DISCONTINUED | OUTPATIENT
Start: 2018-01-19 | End: 2018-01-19

## 2018-01-19 RX ORDER — ACETAMINOPHEN 10 MG/ML
1000 INJECTION, SOLUTION INTRAVENOUS EVERY 8 HOURS
Status: DISPENSED | OUTPATIENT
Start: 2018-01-19 | End: 2018-01-20

## 2018-01-19 RX ORDER — ACETAMINOPHEN 10 MG/ML
1000 INJECTION, SOLUTION INTRAVENOUS EVERY 8 HOURS
Status: DISCONTINUED | OUTPATIENT
Start: 2018-01-19 | End: 2018-01-19

## 2018-01-19 RX ORDER — BUTALBITAL, ACETAMINOPHEN AND CAFFEINE 50; 325; 40 MG/1; MG/1; MG/1
1 TABLET ORAL EVERY 4 HOURS PRN
Status: DISCONTINUED | OUTPATIENT
Start: 2018-01-19 | End: 2018-01-20 | Stop reason: HOSPADM

## 2018-01-19 RX ORDER — OXYCODONE HYDROCHLORIDE 5 MG/1
10 TABLET ORAL EVERY 6 HOURS PRN
Status: DISCONTINUED | OUTPATIENT
Start: 2018-01-19 | End: 2018-01-20 | Stop reason: HOSPADM

## 2018-01-19 RX ADMIN — ACETAMINOPHEN 650 MG: 325 TABLET ORAL at 04:01

## 2018-01-19 RX ADMIN — LEVETIRACETAM 500 MG: 500 TABLET ORAL at 09:01

## 2018-01-19 RX ADMIN — HEPARIN SODIUM 5000 UNITS: 5000 INJECTION, SOLUTION INTRAVENOUS; SUBCUTANEOUS at 01:01

## 2018-01-19 RX ADMIN — SODIUM CHLORIDE TAB 1 GM 2 G: 1 TAB at 09:01

## 2018-01-19 RX ADMIN — BUTALBITAL, ACETAMINOPHEN AND CAFFEINE 1 TABLET: 50; 325; 40 TABLET ORAL at 03:01

## 2018-01-19 RX ADMIN — SODIUM CHLORIDE TAB 1 GM 2 G: 1 TAB at 01:01

## 2018-01-19 RX ADMIN — HYDROCODONE BITARTRATE AND ACETAMINOPHEN 1 TABLET: 10; 325 TABLET ORAL at 12:01

## 2018-01-19 RX ADMIN — HEPARIN SODIUM 5000 UNITS: 5000 INJECTION, SOLUTION INTRAVENOUS; SUBCUTANEOUS at 09:01

## 2018-01-19 RX ADMIN — HEPARIN SODIUM 5000 UNITS: 5000 INJECTION, SOLUTION INTRAVENOUS; SUBCUTANEOUS at 06:01

## 2018-01-19 RX ADMIN — BUTALBITAL, ACETAMINOPHEN AND CAFFEINE 1 TABLET: 50; 325; 40 TABLET ORAL at 07:01

## 2018-01-19 RX ADMIN — LEVETIRACETAM 500 MG: 500 TABLET ORAL at 08:01

## 2018-01-19 RX ADMIN — ACETAMINOPHEN 1000 MG: 10 INJECTION, SOLUTION INTRAVENOUS at 10:01

## 2018-01-19 RX ADMIN — OXYCODONE HYDROCHLORIDE 10 MG: 5 TABLET ORAL at 01:01

## 2018-01-19 RX ADMIN — SODIUM CHLORIDE TAB 1 GM 2 G: 1 TAB at 06:01

## 2018-01-19 RX ADMIN — OXYCODONE HYDROCHLORIDE 10 MG: 5 TABLET ORAL at 09:01

## 2018-01-19 RX ADMIN — FENTANYL CITRATE 25 MCG: 50 INJECTION, SOLUTION INTRAMUSCULAR; INTRAVENOUS at 06:01

## 2018-01-19 NOTE — PROGRESS NOTES
[facility]  Neurosurgery  Progress Note    Subjective:     History of Present Illness: Thuan Reynolds is a 37 y.o. male without significant PMH who presents to [facility] ED after witnessed fall outside of his physical therapy clinic. + LOC of unknown duration. Possible witnessed seizure activity by apparent bystander who is not at bedside. CT head on arrival shows bifrontal hemorrhagic contusions. No witnessed seizure activity while in ED. Currently c/o severe frontal HA and photophobia. No anticoagulant use.    Post-Op Info:  * No surgery found *         Interval History: Neurologically stable. HAs remain severe and constant, with associated photophobia. Started fioricet for pain control. Initiated PT to get OOB.    Medications:  Continuous Infusions:   sodium chloride 0.9% 100 mL/hr at 01/18/18 1300     Scheduled Meds:   acetaminophen  1,000 mg Intravenous Q8H    heparin (porcine)  5,000 Units Subcutaneous Q8H    levETIRAcetam  500 mg Oral BID    sodium chloride  2 g Oral TID     PRN Meds:butalbital-acetaminophen-caffeine -40 mg, fentaNYL, magnesium oxide, magnesium oxide, ondansetron, oxyCODONE, potassium chloride 10%, potassium chloride 10%, potassium chloride 10%, potassium, sodium phosphates, potassium, sodium phosphates, potassium, sodium phosphates, senna-docusate 8.6-50 mg     Review of Systems  Objective:     Weight: 103.6 kg (228 lb 6.3 oz)  Body mass index is 33.73 kg/m².  Vital Signs (Most Recent):  Temp: 98.9 °F (37.2 °C) (01/19/18 1138)  Pulse: (!) 53 (01/19/18 1143)  Resp: 18 (01/19/18 1138)  BP: (!) 142/80 (01/19/18 1138)  SpO2: 98 % (01/19/18 1138) Vital Signs (24h Range):  Temp:  [98.7 °F (37.1 °C)-99.3 °F (37.4 °C)] 98.9 °F (37.2 °C)  Pulse:  [51-57] 53  Resp:  [16-18] 18  SpO2:  [93 %-98 %] 98 %  BP: (119-142)/(60-84) 142/80                           Neurosurgery Physical Exam  General: well developed, well nourished, no distress.   Head: normocephalic, L  periorbital ecchymosis   Neurologic: Alert and oriented. Thought content appropriate.  GCS: Motor: 6/Verbal: 5/Eyes: 4 GCS Total: 15  Mental Status: Awake, Alert, Oriented x 4  Language: No aphasia  Speech: No dysarthria  Cranial nerves: face symmetric, tongue midline, CN II-XII grossly intact.   Eyes: pupils equal, round, reactive to light with accomodation, EOMI.  Pulmonary: normal respirations, no signs of respiratory distress  Abdomen: soft, non-distended, not tender to palpation  Sensory: intact to light touch throughout    Motor Strength: Moves all extremities spontaneously with good tone.  Full strength upper and lower extremities. No abnormal movements seen.     DTR's: 2 + and symmetric in UE and LE  Pronator Drift: no drift noted  Finger-to-nose: Intact bilaterally  Pulses: 2+ and symmetric radial and dorsalis pedis.  Skin: Skin is warm, dry and intact.    Significant Labs:    Recent Labs  Lab 01/18/18  0403 01/19/18  0550   * 105   * 137   K 3.9 4.1    106   CO2 25 22*   BUN 16 21*   CREATININE 0.8 0.7   CALCIUM 9.1 8.9   MG 2.1 2.2       Recent Labs  Lab 01/18/18  0403   WBC 14.02*   HGB 15.0   HCT 41.9        No results for input(s): LABPT, INR, APTT in the last 48 hours.  Microbiology Results (last 7 days)     ** No results found for the last 168 hours. **          Significant Diagnostics:  No new imaging.    Assessment/Plan:     * SAH (subarachnoid hemorrhage)    37 y.o. male with traumatic bifrontal hemorrhagic contusions and tentorium SDH.    -- Neurologically stable, with continued HA and photophobia. Started Fioricet PRN.  --Continue levetiracetam 500 bid for 7 days for early PTS PPX in setting of mild closed TBI.  -- Discharge pending clinical improvement. F/u 2 weeks after discharge with CT head.             Christal Kang PA-C  Neurosurgery  [facility]

## 2018-01-19 NOTE — PLAN OF CARE
Problem: Fall Risk (Adult)  Goal: Absence of Falls  Patient will demonstrate the desired outcomes by discharge/transition of care.   Outcome: Ongoing (interventions implemented as appropriate)   01/19/18 0339   Fall Risk (Adult)   Absence of Falls achieves outcome       Problem: Patient Care Overview  Goal: Plan of Care Review  Outcome: Ongoing (interventions implemented as appropriate)   01/19/18 0339   Coping/Psychosocial   Plan Of Care Reviewed With patient     Assumed care of patient at 1900. Aox4. Complains of [place], medication given, see MAR. Free of falls. No changes to neuro changes. Fall education given. Call light in reach, bed in lowest position. Will continue to monitor.     Problem: Stroke (Hemorrhagic) (Adult)  Goal: Signs and Symptoms of Listed Potential Problems Will be Absent, Minimized or Managed (Stroke)  Signs and symptoms of listed potential problems will be absent, minimized or managed by discharge/transition of care (reference Stroke (Hemorrhagic) (Adult) CPG).   Outcome: Ongoing (interventions implemented as appropriate)   01/19/18 0339   Stroke (Hemorrhagic)   Problems Assessed (Stroke (Hemorrhagic)) all   Problems Present (Stroke (Hemorrhagic)) none   Neurologically intact, no changes with neuro checks.

## 2018-01-19 NOTE — ASSESSMENT & PLAN NOTE
37 y.o. male with traumatic bifrontal hemorrhagic contusions and tentorium SDH.    -- Neurologically stable, with continued HA and photophobia. Started Fioricet PRN.  --Continue levetiracetam 500 bid for 7 days for early PTS PPX in setting of mild closed TBI.  -- Discharge pending clinical improvement. F/u 2 weeks after discharge with CT head.

## 2018-01-19 NOTE — NURSING
[facility], spoke with Dr. Soares about patient going over the 3000mg acetaminophen max. He said that we would just have to wait on giving the pain medication. Tried to see if maybe we can try something different for pain medication, but he did not feel like it was appropriate at this time. Will continue to monitor.

## 2018-01-19 NOTE — PLAN OF CARE
Problem: Patient Care Overview  Goal: Plan of Care Review  Outcome: Ongoing (interventions implemented as appropriate)  Patient AAOx4. VSS. Patient calm and cooperative. Complaints of pain. PRN oxycodone and Fioricet administered. Family remains at patient bedside. Environment adjusted for patient comfort. Lights remain off and shades down. Patient remains free of falls.

## 2018-01-19 NOTE — SUBJECTIVE & OBJECTIVE
Interval History: Neurologically stable. HAs remain severe and constant, with associated photophobia. Started fioricet for pain control. Initiated PT to get OOB.    Medications:  Continuous Infusions:   sodium chloride 0.9% 100 mL/hr at 01/18/18 1300     Scheduled Meds:   acetaminophen  1,000 mg Intravenous Q8H    heparin (porcine)  5,000 Units Subcutaneous Q8H    levETIRAcetam  500 mg Oral BID    sodium chloride  2 g Oral TID     PRN Meds:butalbital-acetaminophen-caffeine -40 mg, fentaNYL, magnesium oxide, magnesium oxide, ondansetron, oxyCODONE, potassium chloride 10%, potassium chloride 10%, potassium chloride 10%, potassium, sodium phosphates, potassium, sodium phosphates, potassium, sodium phosphates, senna-docusate 8.6-50 mg     Review of Systems  Objective:     Weight: 103.6 kg (228 lb 6.3 oz)  Body mass index is 33.73 kg/m².  Vital Signs (Most Recent):  Temp: 98.9 °F (37.2 °C) (01/19/18 1138)  Pulse: (!) 53 (01/19/18 1143)  Resp: 18 (01/19/18 1138)  BP: (!) 142/80 (01/19/18 1138)  SpO2: 98 % (01/19/18 1138) Vital Signs (24h Range):  Temp:  [98.7 °F (37.1 °C)-99.3 °F (37.4 °C)] 98.9 °F (37.2 °C)  Pulse:  [51-57] 53  Resp:  [16-18] 18  SpO2:  [93 %-98 %] 98 %  BP: (119-142)/(60-84) 142/80                           Neurosurgery Physical Exam  General: well developed, well nourished, no distress.   Head: normocephalic, L periorbital ecchymosis   Neurologic: Alert and oriented. Thought content appropriate.  GCS: Motor: 6/Verbal: 5/Eyes: 4 GCS Total: 15  Mental Status: Awake, Alert, Oriented x 4  Language: No aphasia  Speech: No dysarthria  Cranial nerves: face symmetric, tongue midline, CN II-XII grossly intact.   Eyes: pupils equal, round, reactive to light with accomodation, EOMI.  Pulmonary: normal respirations, no signs of respiratory distress  Abdomen: soft, non-distended, not tender to palpation  Sensory: intact to light touch throughout    Motor Strength: Moves all extremities spontaneously with  good tone.  Full strength upper and lower extremities. No abnormal movements seen.     DTR's: 2 + and symmetric in UE and LE  Pronator Drift: no drift noted  Finger-to-nose: Intact bilaterally  Pulses: 2+ and symmetric radial and dorsalis pedis.  Skin: Skin is warm, dry and intact.    Significant Labs:    Recent Labs  Lab 01/18/18  0403 01/19/18  0550   * 105   * 137   K 3.9 4.1    106   CO2 25 22*   BUN 16 21*   CREATININE 0.8 0.7   CALCIUM 9.1 8.9   MG 2.1 2.2       Recent Labs  Lab 01/18/18  0403   WBC 14.02*   HGB 15.0   HCT 41.9        No results for input(s): LABPT, INR, APTT in the last 48 hours.  Microbiology Results (last 7 days)     ** No results found for the last 168 hours. **          Significant Diagnostics:  No new imaging.

## 2018-01-19 NOTE — PLAN OF CARE
CM met with patient   from [facility]. Medical Case Manager is Diane Akbar RN, [phone] (c) or [phone] (f).  Medical Case Manager was provided with copy of latest CT scan, MAR, and 1/18 progress notes.

## 2018-01-19 NOTE — HOSPITAL COURSE
1/19: Neurologically stable. HAs remain severe and constant, with associated photophobia. Started fioricet for pain control. Initiated PT to get OOB.

## 2018-01-20 VITALS
HEIGHT: 69 IN | DIASTOLIC BLOOD PRESSURE: 68 MMHG | WEIGHT: 228.38 LBS | RESPIRATION RATE: 18 BRPM | BODY MASS INDEX: 33.83 KG/M2 | TEMPERATURE: 98 F | SYSTOLIC BLOOD PRESSURE: 134 MMHG | OXYGEN SATURATION: 94 % | HEART RATE: 55 BPM

## 2018-01-20 LAB
ALBUMIN SERPL BCP-MCNC: 3.5 G/DL
ALP SERPL-CCNC: 56 U/L
ALT SERPL W/O P-5'-P-CCNC: 17 U/L
ANION GAP SERPL CALC-SCNC: 9 MMOL/L
AST SERPL-CCNC: 15 U/L
BILIRUB SERPL-MCNC: 0.6 MG/DL
BUN SERPL-MCNC: 12 MG/DL
CALCIUM SERPL-MCNC: 8.6 MG/DL
CHLORIDE SERPL-SCNC: 100 MMOL/L
CO2 SERPL-SCNC: 25 MMOL/L
CREAT SERPL-MCNC: 0.7 MG/DL
EST. GFR  (AFRICAN AMERICAN): >60 ML/MIN/1.73 M^2
EST. GFR  (NON AFRICAN AMERICAN): >60 ML/MIN/1.73 M^2
GLUCOSE SERPL-MCNC: 82 MG/DL
MAGNESIUM SERPL-MCNC: 2.2 MG/DL
PHOSPHATE SERPL-MCNC: 2.3 MG/DL
POTASSIUM SERPL-SCNC: 3.5 MMOL/L
PROT SERPL-MCNC: 7.1 G/DL
SODIUM SERPL-SCNC: 134 MMOL/L

## 2018-01-20 PROCEDURE — 83735 ASSAY OF MAGNESIUM: CPT

## 2018-01-20 PROCEDURE — 25000003 PHARM REV CODE 250: Performed by: PHYSICIAN ASSISTANT

## 2018-01-20 PROCEDURE — G8978 MOBILITY CURRENT STATUS: HCPCS | Mod: CK

## 2018-01-20 PROCEDURE — 80053 COMPREHEN METABOLIC PANEL: CPT

## 2018-01-20 PROCEDURE — 63600175 PHARM REV CODE 636 W HCPCS: Performed by: PHYSICIAN ASSISTANT

## 2018-01-20 PROCEDURE — 84100 ASSAY OF PHOSPHORUS: CPT

## 2018-01-20 PROCEDURE — 25000003 PHARM REV CODE 250: Performed by: STUDENT IN AN ORGANIZED HEALTH CARE EDUCATION/TRAINING PROGRAM

## 2018-01-20 PROCEDURE — 97161 PT EVAL LOW COMPLEX 20 MIN: CPT

## 2018-01-20 PROCEDURE — G8979 MOBILITY GOAL STATUS: HCPCS | Mod: CJ

## 2018-01-20 PROCEDURE — 36415 COLL VENOUS BLD VENIPUNCTURE: CPT

## 2018-01-20 RX ORDER — BUTALBITAL, ACETAMINOPHEN AND CAFFEINE 50; 325; 40 MG/1; MG/1; MG/1
1 TABLET ORAL EVERY 4 HOURS PRN
Qty: 30 TABLET | Refills: 0 | Status: SHIPPED | OUTPATIENT
Start: 2018-01-20 | End: 2018-01-26 | Stop reason: SDUPTHER

## 2018-01-20 RX ORDER — FAMOTIDINE 20 MG/1
20 TABLET, FILM COATED ORAL 2 TIMES DAILY
Status: DISCONTINUED | OUTPATIENT
Start: 2018-01-20 | End: 2018-01-20 | Stop reason: HOSPADM

## 2018-01-20 RX ORDER — AMOXICILLIN 250 MG
1 CAPSULE ORAL DAILY
Status: DISCONTINUED | OUTPATIENT
Start: 2018-01-20 | End: 2018-01-20 | Stop reason: HOSPADM

## 2018-01-20 RX ORDER — AMOXICILLIN 250 MG
1 CAPSULE ORAL DAILY PRN
Status: DISCONTINUED | OUTPATIENT
Start: 2018-01-20 | End: 2018-01-20 | Stop reason: HOSPADM

## 2018-01-20 RX ORDER — OXYCODONE HYDROCHLORIDE 10 MG/1
10 TABLET ORAL EVERY 6 HOURS PRN
Qty: 30 TABLET | Refills: 0 | Status: SHIPPED | OUTPATIENT
Start: 2018-01-20 | End: 2018-01-26 | Stop reason: SDUPTHER

## 2018-01-20 RX ADMIN — FAMOTIDINE 20 MG: 20 TABLET, FILM COATED ORAL at 08:01

## 2018-01-20 RX ADMIN — STANDARDIZED SENNA CONCENTRATE AND DOCUSATE SODIUM 1 TABLET: 8.6; 5 TABLET, FILM COATED ORAL at 07:01

## 2018-01-20 RX ADMIN — BUTALBITAL, ACETAMINOPHEN AND CAFFEINE 1 TABLET: 50; 325; 40 TABLET ORAL at 12:01

## 2018-01-20 RX ADMIN — SODIUM CHLORIDE TAB 1 GM 2 G: 1 TAB at 01:01

## 2018-01-20 RX ADMIN — SODIUM CHLORIDE: 0.9 INJECTION, SOLUTION INTRAVENOUS at 04:01

## 2018-01-20 RX ADMIN — LEVETIRACETAM 500 MG: 500 TABLET ORAL at 08:01

## 2018-01-20 RX ADMIN — BUTALBITAL, ACETAMINOPHEN AND CAFFEINE 1 TABLET: 50; 325; 40 TABLET ORAL at 07:01

## 2018-01-20 RX ADMIN — OXYCODONE HYDROCHLORIDE 10 MG: 5 TABLET ORAL at 04:01

## 2018-01-20 RX ADMIN — HEPARIN SODIUM 5000 UNITS: 5000 INJECTION, SOLUTION INTRAVENOUS; SUBCUTANEOUS at 05:01

## 2018-01-20 RX ADMIN — OXYCODONE HYDROCHLORIDE 10 MG: 5 TABLET ORAL at 11:01

## 2018-01-20 RX ADMIN — HEPARIN SODIUM 5000 UNITS: 5000 INJECTION, SOLUTION INTRAVENOUS; SUBCUTANEOUS at 01:01

## 2018-01-20 RX ADMIN — SODIUM CHLORIDE TAB 1 GM 2 G: 1 TAB at 05:01

## 2018-01-20 NOTE — PLAN OF CARE
Problem: Patient Care Overview  Goal: Plan of Care Review  Outcome: Ongoing (interventions implemented as appropriate)  Pt Aox4, vss except Hr. Pt running sinus andrés in low 45-50's. This is pts baseline. Pt c/o of extreme headache pain. Pt found relief w/ oxy PRN. Pt wife at bedside. Pt left eye bruised and pt c/o of pain in that eye. Pt slept for most of shift. Pt denies other pain. NS @ 100ml/hr. Safety precautions maintained WCTM.

## 2018-01-20 NOTE — PLAN OF CARE
Problem: Physical Therapy Goal  Goal: Physical Therapy Goal  Goals to be met by: 2/3/2018     Patient will increase functional independence with mobility by performin. Sit to stand transfer with Supervision  2. Bed to chair transfer with Supervision   3. Gait  x 150 feet with Supervision   4. Ascend/descend 15 stair with left Handrails Contact Guard Assistance.       Thuan Reynolds is a 37 y.o. male admitted with a medical diagnosis of SAH (subarachnoid hemorrhage).  He presents with the following impairments/functional limitations:  pain, impaired balance. Pt at overall SBA for transfers and mobility. Pt with mild sway during eyes closed. No LOB. Pt with appropriate balance righting reactions with moderate perturbations provided from therapist. Pt with HA increasing from 2/10 to 7/10 throughout evaluation. Pt able to tolerate 75 feet of ambulation with SBA and No LOB. Pt with continued photophobia during evaluation. Pt with 15 steps to get to bed and bath, but unable to test safety/tolerance secondary to increasing HA. Pt returned to room and requested to lie down with eyes covered. PT requested x 3 for additional services to be ordered for OT and SLP to address possible concerns with ADLs/daily activities/cognitive/processing. Pt with no further post acute needs from mobility as with improvement in HA and photophobia, patient should return to full independence. PT to continue to see patient for stair negotiation during acute stay.

## 2018-01-20 NOTE — ASSESSMENT & PLAN NOTE
37 y.o. male with traumatic bifrontal hemorrhagic contusions and tentorium SDH.    -- Neurologically stable, with continued HA and photophobia. Started Fioricet PRN.  --Pt will be discharged home with instructions to follow up in two weeks in outpatient clinic with interval imaging and with a referral to concussion clinic

## 2018-01-20 NOTE — DISCHARGE INSTRUCTIONS
Please follow ONLY the instructions that are checked below.    Activity Restrictions:  [x]  Return to work will be determined on an individual basis.  [x]  No lifting greater than 10 pounds.  [x]  No driving or operating machinery:  [x]  until cleared by your surgeon.  [x]  while taking narcotic pain medications or muscle relaxants.  [x]  Increase ambulation over the next 2 weeks so that you are walking 2 miles per day at 2 weeks post-operatively.  [x]  Walk on paved surfaces only. It is okay to walk up and down stairs while holding onto a side rail.  [x]  No sexual activity for 2-3 weeks.    Discharge Medication/Follow-up:  [x]  Please refer to discharge medication reconciliation form.  [x]  Do not take ANY non-steroidal anti-inflammatory drugs (NSAIDS), including the following: ibuprofen, naprosyn, Aleve, Advil, Indocin, Mobic, or Celebrex for:  [x]  4 weeks  []  8 weeks  []  6 months  []  Prescriptions for appropriate medication will be given upon discharge.   []  Pain control:             []  Other:             [x]  Take docusate (Colace 100 mg): take one capsule a day as needed for constipation. You can get this over the counter.  [x]  Follow-up appointment:  [x]  10-14 days post-op for wound check by physician assistant/nurse  []  4-6 weeks with MD:  []  with CT / MRI  []  without CT / MRI  []  An appointment will be mailed to you.    Wound Care:  []  Remove dressing or bandaid in    days.  []  No bandage required. Keep your incision open to the air.  []  You may shower on the 2nd day after your surgery. Have the force of water hit you opposite from the incision. Pat the incision dry after your shower; do not scrub the incision.  []  You cannot take a bath until 8 weeks after surgery.  []  Apply bacitracin to incision twice a day for    more days.    Call your doctor or go to the Emergency Room for any signs of infection, including: increased redness, drainage, pain, or fever (temperature ?101.5 for 24 hours).  Call your doctor or go to the Emergency Room if there are any localized neurological changes; problems with speech, vision, numbness, tingling, weakness, or severe headache; or for other concerns.    Special Instructions:  [x]  No use of tobacco products.  [x]  Diet: Please eat a regular diet as tolerated.  []  Other diet:              Specific physician instructions:           Physicians need 3 days' notice for pain medicine to be refilled. Pain medicine will only be refilled between 8 AM and 5 PM, Monday through Friday, due to Food and Drug Administration regulation of documentation.    If you have any questions about this form, please call [phone].    Form No. 57346 (Revised 10/31/2013)

## 2018-01-20 NOTE — PT/OT/SLP EVAL
Physical Therapy Evaluation    Patient Name:  Thuan Reynolds   MRN:  [id number]    Recommendations:     Discharge Recommendations:  home (pt would benefit from outpatient speech consult for cognitive assessment for return to work)   Discharge Equipment Recommendations: none   Barriers to discharge: Inaccessible home    Assessment:     Thuan Reynolds is a 37 y.o. male admitted with a medical diagnosis of SAH (subarachnoid hemorrhage).  He presents with the following impairments/functional limitations:  pain, impaired balance. Pt at overall SBA for transfers and mobility. Pt with mild sway during eyes closed. No LOB. Pt with appropriate balance righting reactions with moderate perturbations provided from therapist. Pt with HA increasing from 2/10 to 7/10 throughout evaluation. Pt able to tolerate 75 feet of ambulation with SBA and No LOB. Pt with continued photophobia during evaluation. Pt with 15 steps to get to bed and bath, but unable to test safety/tolerance secondary to increasing HA. Pt returned to room and requested to lie down with eyes covered. PT requested x 3 for additional services to be ordered for OT and SLP to address possible concerns with ADLs/daily activities/cognitive/processing. Pt with no further post acute needs from mobility as with improvement in HA and photophobia, patient should return to full independence. PT to continue to see patient for stair negotiation during acute stay.     Rehab Prognosis:  good; patient would benefit from acute skilled PT services to address these deficits and reach maximum level of function.      Recent Surgery: * No surgery found *      Plan:     During this hospitalization, patient to be seen 3 x/week to address the above listed problems via gait training, therapeutic activities  · Plan of Care Expires:  02/19/18   Plan of Care Reviewed with: patient, spouse    Subjective     Communicated with RN prior to session.  Patient found seated in bedside chair  upon PT entry to room, agreeable to evaluation.      Chief Complaint: headache and sensitivity to light  Patient comments/goals: get home  Pain/Comfort:  · Pain Rating 1: 2/10  · Location - Side 1: Bilateral  · Location - Orientation 1: generalized  · Location 1: head  · Pain Addressed 1: Pre-medicate for activity, Cessation of Activity, Nurse notified  · Pain Rating Post-Intervention 1: 7/10    Patients cultural, spiritual, Adventist conflicts given the current situation: none noted    Living Environment:  Pt reports that he lives in 2 story home with wife and 2 dogs. Pt reports having 2 NEPTALI with no HRS plus 15 steps to bed and bathroom. 15 steps have left handrail on 1/2 and right handrail on second half. Pt with walk in shower.   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  Patient has the following equipment: none.  DME owned (not currently used): none.  Upon discharge, patient will have assistance from wife.    Objective:     Patient found with:  all intact    General Precautions: Standard, fall   Orthopedic Precautions:N/A   Braces: N/A     Exams:  · Cognitive Exam:  Patient is oriented to Person, Place, Time and Situation and follows 100% of 25 commands   · Sensation:    · -       Intact  · RLE Strength: WNL  · LLE Strength: WNL    Functional Mobility:  · Bed Mobility:     · Sit to Supine: independence  · Transfers:     · Sit to Stand:  stand by assistance with no AD  · Gait: Pt ambulated 75 feet with SBA and no AD. Pt able to self propel IV pole. No LOB noted.   · Balance: Pt performed standing with eyes closed: mild sway noted; no LOB; Pt able to tolerate moderate perturbations from therapist with no LOB and appropriate righting reactions.     AM-PAC 6 CLICK MOBILITY  Total Score:19     Patient left supine with all lines intact, call button in reach, RN notified and wife present.    GOALS:    Physical Therapy Goals         Problem: Physical Therapy Goal    Goal Priority Disciplines Outcome Goal Variances Interventions   Physical Therapy Goal     PT/OT, PT      Description:  Goals to be met by: 2/3/2018     Patient will increase functional independence with mobility by performin. Sit to stand transfer with Supervision  2. Bed to chair transfer with Supervision   3. Gait  x 150 feet with Supervision   4. Ascend/descend 15 stair with left Handrails Contact Guard Assistance.                        History:     Past Medical History:   Diagnosis Date    Seizure        Past Surgical History:   Procedure Laterality Date    VARIOCOCELE REPAIR  2008    left side       Clinical Decision Making:     History  Co-morbidities and personal factors that may impact the plan of care Examination  Body Structures and Functions, activity limitations and participation restrictions that may impact the plan of care Clinical Presentation   Decision Making/ Complexity Score   Co-morbidities:   [] Time since onset of injury / illness / exacerbation  [x] Status of current condition  []Patient's cognitive status and safety concerns    [] Multiple Medical Problems (see med hx)  Personal Factors:   [] Patient's age  [] Prior Level of function   [] Patient's home situation (environment and family support)  [] Patient's level of motivation  [] Expected progression of patient      HISTORY:(criteria)    [] 27279 - no personal factors/history    [x] 97866 - has 1-2 personal factor/comorbidity     [] 04031 - has >3 personal factor/comorbidity     Body Regions:  [] Objective examination findings  [] Head     []  Neck  [] Trunk   [] Upper Extremity  [x] Lower Extremity    Body Systems:  [x] For communication ability, affect, cognition, language, and learning style: the assessment of the ability to make needs known, consciousness, orientation (person, place, and time), expected emotional /behavioral responses, and learning preferences (eg, learning barriers,  education  needs)  [x] For the neuromuscular system: a general assessment of gross coordinated movement (eg, balance, gait, locomotion, transfers, and transitions) and motor function  (motor control and motor learning)  [x] For the musculoskeletal system: the assessment of gross symmetry, gross range of motion, gross strength, height, and weight  [] For the integumentary system: the assessment of pliability(texture), presence of scar formation, skin color, and skin integrity  [] For cardiovascular/pulmonary system: the assessment of heart rate, respiratory rate, blood pressure, and edema     Activity limitations:    [] Patient's cognitive status and saf ety concerns          [] Status of current condition      [] Weight bearing restriction  [] Cardiopulmunary Restriction    Participation Restrictions:   [] Goals and goal agreement with the patient     [] Rehab potential (prognosis) and probable outcome      Examination of Body System: (criteria)    [] 10859 - addressing 1-2 elements    [x] 03308 - addressing a total of 3 or more elements     [] 94935 -  Addressing a total of 4 or more elements         Clinical Presentation: (criteria)  Evolving - 50326     On examination of body system using standardized tests and measures patient presents with 1-2 elements from any of the following: body structures and functions, activity limitations, and/or participation restrictions.  Leading to a clinical presentation that is considered evolving with changing characteristics                              Clinical Decision Making  (Eval Complexity):  Low- 17484     Time Tracking:     PT Received On: 01/20/18  PT Start Time: 0917     PT Stop Time: 0936  PT Total Time (min): 19 min     Billable Minutes: Evaluation 1 procedure      Lee Ann Rosenberg, PT  01/20/2018

## 2018-01-20 NOTE — DISCHARGE SUMMARY
[facility]  Neurosurgery  Discharge Summary      Patient Name: Thuan Reynolds  MRN: [id number]  Admission Date: 1/17/2018  Hospital Length of Stay: 3 days  Discharge Date and Time:  01/20/2018 1:06 PM  Attending Physician: Skyler Jose MD   Discharging Provider: Rubén Lozano MD  Primary Care Provider: Primary Doctor No    HPI:   Thuna Reynolds is a 37 y.o. 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 + LOC of unknown duration. Possible witnessed seizure activity by apparent bystander who is not at bedside. CT head on arrival shows bifrontal hemorrhagic contusions. No witnessed seizure activity while in ED. Currently c/o severe frontal HA and photophobia. No anticoagulant use.    * No surgery found *     Hospital Course: 1/19: Neurologically stable. HAs remain severe and constant, with associated photophobia. Started fioricet for pain control. Initiated PT to get OOB.    Consults:   Consults         Status Ordering Provider     Inpatient consult to Neuro ICU  Once     Provider:  (Not yet assigned)    Acknowledged SAL FENG     Inpatient consult to Neurosurgery  Once     Provider:  (Not yet assigned)    Completed SAL FENG          Significant Diagnostic Studies: Labs: All labs within the past 24 hours have been reviewed    Pending Diagnostic Studies:     None        Final Active Diagnoses:    Diagnosis Date Noted POA    PRINCIPAL PROBLEM:  SAH (subarachnoid hemorrhage) [I60.9] 01/17/2018 Yes    Seizure [R56.9]  Yes    Subdural hematoma [I62.00]  Yes      Problems Resolved During this Admission:    Diagnosis Date Noted Date Resolved POA      Discharged Condition: good    Disposition: Home or Self Care    Follow Up:  Follow-up Information     Cedric Tolentino MD In 2 weeks.    Specialty:  Neurosurgery  Why:  for checkup with Dr. Tolentino  Contact information:  [address]  [address]  [place]  [address]  [phone]                 Patient Instructions:     Ambulatory Consult to Concussion Management Program   Referral Priority: Routine Referral Type: Consultation   Referral Reason: Specialty Services Required    Requested Specialty: Neurology    Number of Visits Requested: 1      Diet Adult Regular     Lifting restrictions   Order Comments: No lifting more than 5 pounds     Notify your health care provider if you experience any of the following:  temperature >100.4     Notify your health care provider if you experience any of the following:  persistent nausea and vomiting or diarrhea     Notify your health care provider if you experience any of the following:  severe uncontrolled pain     Notify your health care provider if you experience any of the following:  difficulty breathing or increased cough     Notify your health care provider if you experience any of the following:  redness, tenderness, or signs of infection (pain, swelling, redness, odor or green/yellow discharge around incision site)     Notify your health care provider if you experience any of the following:  severe persistent headache     Notify your health care provider if you experience any of the following:  worsening rash     Notify your health care provider if you experience any of the following:  persistent dizziness, light-headedness, or visual disturbances     Notify your health care provider if you experience any of the following:  increased confusion or weakness     Notify your health care provider if you experience any of the following:       Medications:  Reconciled Home Medications:   Current Discharge Medication List      START taking these medications    Details   butalbital-acetaminophen-caffeine -40 mg (FIORICET, ESGIC) -40 mg per tablet Take 1 tablet by mouth every 4 (four) hours as needed for Headaches.  Qty: 30 tablet, Refills: 0      oxyCODONE (ROXICODONE) 10 mg Tab immediate release tablet Take 1 tablet (10  mg total) by mouth every 6 (six) hours as needed.  Qty: 30 tablet, Refills: 0         CONTINUE these medications which have NOT CHANGED    Details   predniSONE (DELTASONE) 5 MG tablet 6 PO BID x 5 days  4 PO BID x 1 day  3 PO BID x 1 day  2 PO BID x1 day  1 PO BID x 1 day    1 PO x 1 day  Qty: 90 tablet, Refills: 0    Associated Diagnoses: Bell's palsy             Rubén Lozano MD  Neurosurgery  [facility]

## 2018-01-22 ENCOUNTER — TELEPHONE (OUTPATIENT)
Dept: NEUROSURGERY | Facility: CLINIC | Age: 38
End: 2018-01-22

## 2018-01-22 ENCOUNTER — OFFICE VISIT (OUTPATIENT)
Dept: NEUROSURGERY | Facility: CLINIC | Age: 38
End: 2018-01-22
Payer: COMMERCIAL

## 2018-01-22 VITALS
HEIGHT: 69 IN | SYSTOLIC BLOOD PRESSURE: 125 MMHG | BODY MASS INDEX: 33.77 KG/M2 | WEIGHT: 228 LBS | HEART RATE: 65 BPM | DIASTOLIC BLOOD PRESSURE: 81 MMHG

## 2018-01-22 DIAGNOSIS — S06.332D: Primary | ICD-10-CM

## 2018-01-22 PROCEDURE — 99213 OFFICE O/P EST LOW 20 MIN: CPT | Mod: S$GLB,,, | Performed by: NEUROLOGICAL SURGERY

## 2018-01-22 PROCEDURE — 99999 PR PBB SHADOW E&M-EST. PATIENT-LVL III: CPT | Mod: PBBFAC,,, | Performed by: NEUROLOGICAL SURGERY

## 2018-01-22 RX ORDER — ALPRAZOLAM 1 MG/1
1 TABLET ORAL 3 TIMES DAILY PRN
Qty: 60 TABLET | Refills: 1 | Status: SHIPPED | OUTPATIENT
Start: 2018-01-22 | End: 2020-01-15

## 2018-01-22 NOTE — PROGRESS NOTES
This office note has been dictated.  Thuan Reynolds was seen in neurosurgical followup at the office this   morning.  He is a 37-year-old man who works at [employer].    He was leaving work and slipped on ice striking the back of his head with loss   of consciousness on 01/17/2018.  He was brought to [facility].  There was a   question of possible seizure activity.  CT showed bifrontal contusions and the   patient was admitted to the Neuro ICU.  While in the hospital, he remained awake   and neurologically intact, but complained of headache, lightheadedness, poor   appetite and was unable to be up and around.  He was treated with medication and   discharged on 01/20/2018 to home.  His wife is an Emergency Room nurse and able   to care for him.  He has continued with severe headache, feelings of dizziness.    He has had no visual change, no loss of hearing, no evidence for seizures.    On examination today, he is alert and cooperative and answering questions   appropriately.  There is still some palpable contusion in the vertex area and he   has ecchymosis around the left eye.  Extraocular movements are full without   nystagmus.  Pupils are equal.  Hearing seems good.  He shows no other focal   weakness.    I reviewed the CT scans of the head done while he was hospitalized.  These show   bifrontal partially hemorrhagic contusions and a small contusion more superiorly   on the left side.    It will take him some time to recover from the closed head injury.  He can   continue with the oxycodone and Fioricet that he was prescribed and we can   refill this if necessary.  I will place him on Xanax 1 mg t.i.d. also.  I will   plan to have him come back in two weeks with a followup CT.      ALICE/HN  dd: 01/22/2018 10:40:51 (CST)  td: 01/23/2018 06:10:16 (CST)  Doc ID   #[id number]  Job ID #[id number]    CC: Thuan Jaramillopolloantonella

## 2018-01-22 NOTE — TELEPHONE ENCOUNTER
Pt is scheduled to see Dr Jose    ----- Message from Rubén Lozano MD sent at 1/20/2018  1:03 PM CST -----  Please have this pt follow up in two weeks with head ct  Thank you.

## 2018-01-22 NOTE — PLAN OF CARE
Patient discharged home. The patient does not have any home needs. Family provided transportation home.    Neurosurgery clinic to schedule follow up appointment.       01/22/18 0815   Final Note   Assessment Type Final Discharge Note   Discharge Disposition Home   Hospital Follow Up  Appt(s) scheduled? (Neurosurgery clinic to schedule follow up appointment.)   Discharge plans and expectations educations in teach back method with documentation complete? Yes

## 2018-01-26 DIAGNOSIS — R40.20 LOSS OF CONSCIOUSNESS: ICD-10-CM
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
## 2018-01-26 DIAGNOSIS — I60.9 SAH (SUBARACHNOID HEMORRHAGE): Primary | ICD-10-CM

## 2018-01-26 DIAGNOSIS — S06.5XAA SDH (SUBDURAL HEMATOMA): ICD-10-CM

## 2018-01-26 DIAGNOSIS — G44.309 POST-TRAUMATIC HEADACHE, NOT INTRACTABLE, UNSPECIFIED CHRONICITY PATTERN: Primary | ICD-10-CM

## 2018-01-26 DIAGNOSIS — I60.9 SAH (SUBARACHNOID HEMORRHAGE): ICD-10-CM

## 2018-01-26 RX ORDER — OXYCODONE HYDROCHLORIDE 10 MG/1
10 TABLET ORAL EVERY 6 HOURS PRN
Qty: 30 TABLET | Refills: 0 | Status: SHIPPED | OUTPATIENT
Start: 2018-01-26 | End: 2020-01-15

## 2018-01-29 RX ORDER — BUTALBITAL, ACETAMINOPHEN AND CAFFEINE 50; 325; 40 MG/1; MG/1; MG/1
1 TABLET ORAL EVERY 6 HOURS PRN
Qty: 30 TABLET | Refills: 0 | Status: SHIPPED | OUTPATIENT
Start: 2018-01-29 | End: 2018-02-28

## 2018-02-05 ENCOUNTER — OFFICE VISIT (OUTPATIENT)
Dept: NEUROSURGERY | Facility: CLINIC | Age: 38
End: 2018-02-05
Payer: COMMERCIAL

## 2018-02-05 ENCOUNTER — HOSPITAL ENCOUNTER (OUTPATIENT)
Dept: RADIOLOGY | Facility: HOSPITAL | Age: 38
Discharge: HOME | End: 2018-02-05
Attending: NEUROLOGICAL SURGERY
Payer: COMMERCIAL

## 2018-02-05 VITALS
WEIGHT: 241.63 LBS | BODY MASS INDEX: 35.79 KG/M2 | HEIGHT: 69 IN | DIASTOLIC BLOOD PRESSURE: 90 MMHG | SYSTOLIC BLOOD PRESSURE: 137 MMHG | TEMPERATURE: 99 F | HEART RATE: 84 BPM

## 2018-02-05 DIAGNOSIS — S06.332D: ICD-10-CM

## 2018-02-05 DIAGNOSIS — S06.332D: Primary | ICD-10-CM

## 2018-02-05 PROCEDURE — 70450 CT HEAD/BRAIN W/O DYE: CPT | Mod: TC

## 2018-02-05 PROCEDURE — 3008F BODY MASS INDEX DOCD: CPT | Mod: S$GLB,,, | Performed by: NEUROLOGICAL SURGERY

## 2018-02-05 PROCEDURE — 70450 CT HEAD/BRAIN W/O DYE: CPT | Mod: 26,,, | Performed by: RADIOLOGY

## 2018-02-05 PROCEDURE — 99999 PR PBB SHADOW E&M-EST. PATIENT-LVL III: CPT | Mod: PBBFAC,,, | Performed by: NEUROLOGICAL SURGERY

## 2018-02-05 PROCEDURE — 99213 OFFICE O/P EST LOW 20 MIN: CPT | Mod: S$GLB,,, | Performed by: NEUROLOGICAL SURGERY

## 2018-02-05 NOTE — PROGRESS NOTES
This office note has been dictated.  Thuan Reynolds returned in neurosurgical followup to the office today.  He   has been making some improvement, but still has a fairly severe headache.  When   he awakens in the morning, he notes headache and stiffness in his neck and   shoulders.  He is still prone to dizziness and finds that he has easy   fatigability.  By afternoon he has to go lie down.  He has also noted some mild   paresthesia in the fourth and fifth fingers of the left hand.    On brief examination today, he is alert and speaking clearly.  The ecchymosis   around the left eye has cleared.  He shows good extraocular movements and no   nystagmus.  Finger-to-nose was done well.  He did have some difficulty however   with tandem gait.  There is a subjective change in sensation to stroking the   fourth and fifth finger on the left hand.  He is not particularly tender over   the elbow and there is no bruising there.  Intrinsic hand function is good.    CT scan of the brain was repeated at [facility] today and compared to his   last scan of 01/18/18.  There has been pretty much clearing of the hemorrhagic   component of his contusions.  There are still some low density areas bilaterally   along the floor of the anterior fossa.  He appears to be going on to good   healing.    He has not been driving yet.  I do not believe he is ready to return to work.    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  He   will need more time to recover.  I will have him return in two weeks for   followup.  I do not believe he needs additional CT scanning at this point.      ALICE/JIHAN  dd: 02/05/2018 12:44:21 (CST)  td: 02/06/2018 08:42:57 (CST)  Doc ID   #[id number]  Job ID #[id number]    CC: Thuan Reynolds

## 2018-02-06 ENCOUNTER — PATIENT MESSAGE (OUTPATIENT)
Dept: NEUROSURGERY | Facility: CLINIC | Age: 38
End: 2018-02-06

## 2018-02-19 ENCOUNTER — OFFICE VISIT (OUTPATIENT)
Dept: NEUROSURGERY | Facility: CLINIC | Age: 38
End: 2018-02-19
Payer: COMMERCIAL

## 2018-02-19 VITALS
TEMPERATURE: 98 F | HEIGHT: 69 IN | BODY MASS INDEX: 35.72 KG/M2 | HEART RATE: 92 BPM | SYSTOLIC BLOOD PRESSURE: 137 MMHG | WEIGHT: 241.19 LBS | DIASTOLIC BLOOD PRESSURE: 92 MMHG

## 2018-02-19 DIAGNOSIS — S06.332A: Primary | ICD-10-CM

## 2018-02-19 PROCEDURE — 99213 OFFICE O/P EST LOW 20 MIN: CPT | Mod: S$GLB,,, | Performed by: NEUROLOGICAL SURGERY

## 2018-02-19 PROCEDURE — 3008F BODY MASS INDEX DOCD: CPT | Mod: S$GLB,,, | Performed by: NEUROLOGICAL SURGERY

## 2018-02-19 PROCEDURE — 99999 PR PBB SHADOW E&M-EST. PATIENT-LVL III: CPT | Mod: PBBFAC,,, | Performed by: NEUROLOGICAL SURGERY

## 2018-02-19 NOTE — PROGRESS NOTES
This office note has been dictated.  Thuan Reynolds returned in neurosurgical followup to the office today.  He   has made considerable improvement over the last two weeks.  He still continues   with headache, but this has been managed with over-the-counter medication and he   feels his vision is stable, feelings of dizziness has improved.  He feels that   his balance is back to normal.  He does continue with some numbness and   paresthesia in the left ulnar nerve distribution, but this is not limiting to   him and seems to be improving.    On brief examination today, he is alert and speaking well.  He shows good   extraocular movements without nystagmus.  Hearing is good.  Finger-to-nose was   done well as was gait.  He shows no focal weakness.  There is minor change in   sensation on the fifth finger and ulnar side of the fourth finger.  Strength in   the intrinsic hand musculature is good.    I am happy to see him doing well.  I will release him back to work for 02/21/18.    I will have one followup CT scan of the brain done three months post-injury to   be sure there are no complicating factors and plan to see him then.      ALICE/JIHAN  dd: 02/19/2018 13:15:39 (CST)  td: 02/20/2018 07:05:05 (CST)  Doc ID   #[id number]  Job ID #[id number]    CC: Thuan Jaramillopolloantonella

## 2018-04-16 ENCOUNTER — HOSPITAL ENCOUNTER (OUTPATIENT)
Dept: RADIOLOGY | Facility: HOSPITAL | Age: 38
Discharge: HOME | End: 2018-04-16
Attending: NEUROLOGICAL SURGERY
Payer: OTHER MISCELLANEOUS

## 2018-04-16 ENCOUNTER — OFFICE VISIT (OUTPATIENT)
Dept: NEUROSURGERY | Facility: CLINIC | Age: 38
End: 2018-04-16
Payer: OTHER MISCELLANEOUS

## 2018-04-16 VITALS
BODY MASS INDEX: 36.29 KG/M2 | HEART RATE: 83 BPM | DIASTOLIC BLOOD PRESSURE: 88 MMHG | WEIGHT: 245 LBS | HEIGHT: 69 IN | TEMPERATURE: 98 F | SYSTOLIC BLOOD PRESSURE: 142 MMHG

## 2018-04-16 DIAGNOSIS — S06.332A: ICD-10-CM

## 2018-04-16 DIAGNOSIS — S06.331D: Primary | ICD-10-CM

## 2018-04-16 PROCEDURE — 99999 PR PBB SHADOW E&M-EST. PATIENT-LVL III: CPT | Mod: PBBFAC,,, | Performed by: NEUROLOGICAL SURGERY

## 2018-04-16 PROCEDURE — 70450 CT HEAD/BRAIN W/O DYE: CPT | Mod: TC

## 2018-04-16 PROCEDURE — 70450 CT HEAD/BRAIN W/O DYE: CPT | Mod: 26,,, | Performed by: RADIOLOGY

## 2018-04-16 PROCEDURE — 99213 OFFICE O/P EST LOW 20 MIN: CPT | Mod: S$GLB,,, | Performed by: NEUROLOGICAL SURGERY

## 2018-04-16 NOTE — PROGRESS NOTES
This office note has been dictated.  Thuan Reynolds was seen in neurosurgical followup at the office this   afternoon.  He has been doing very well over the last few weeks.  He is back at   work.  He still has occasional headaches, but these are becoming less common.    He feels that his memory is improving and he has had no difficulties doing his   work.  Typing on his computer and other fine movements are done well.  He does   still have some numbness on the tips of his fourth and fifth fingers on the left   hand.  Strength in the hand is good.  The numbness seems to be gradually   disappearing.    On brief examination today, he is bright and alert and speaking clearly.  He   shows no focal neurological deficits.  Strength in the left hand is excellent.    The sensory change is just on the fingertips.    CT scan of the head was repeated at [facility] today and compared to his   earlier scans.  The blood from the contusions has cleared.  There is some   residual hypodensity in the inferior frontal lobes without mass effect.    I am happy to see him doing well.  I have no specific restrictions for him.  I   did not make him a definite appointment to return to Neurosurgery Clinic, but   will of course be happy to see him back if any new problems arise.      ALICE/HN  dd: 04/16/2018 16:25:51 (CDT)  td: 04/17/2018 12:46:57 (CDT)  Doc ID   #[id number]  Job ID #[id number]    CC: Thuan Reynolds

## 2019-05-12 ENCOUNTER — OFFICE VISIT (OUTPATIENT)
Dept: URGENT CARE | Facility: CLINIC | Age: 39
End: 2019-05-12
Payer: COMMERCIAL

## 2019-05-12 VITALS
SYSTOLIC BLOOD PRESSURE: 146 MMHG | DIASTOLIC BLOOD PRESSURE: 86 MMHG | TEMPERATURE: 98 F | HEART RATE: 80 BPM | WEIGHT: 228 LBS | OXYGEN SATURATION: 95 % | BODY MASS INDEX: 33.77 KG/M2 | RESPIRATION RATE: 16 BRPM | HEIGHT: 69 IN

## 2019-05-12 DIAGNOSIS — S91.209A NAIL AVULSION OF TOE, INITIAL ENCOUNTER: Primary | ICD-10-CM

## 2019-05-12 PROCEDURE — 3008F PR BODY MASS INDEX (BMI) DOCUMENTED: ICD-10-PCS | Mod: CPTII,S$GLB,, | Performed by: EMERGENCY MEDICINE

## 2019-05-12 PROCEDURE — 99214 PR OFFICE/OUTPT VISIT, EST, LEVL IV, 30-39 MIN: ICD-10-PCS | Mod: S$GLB,,, | Performed by: EMERGENCY MEDICINE

## 2019-05-12 PROCEDURE — 3008F BODY MASS INDEX DOCD: CPT | Mod: CPTII,S$GLB,, | Performed by: EMERGENCY MEDICINE

## 2019-05-12 PROCEDURE — 99214 OFFICE O/P EST MOD 30 MIN: CPT | Mod: S$GLB,,, | Performed by: EMERGENCY MEDICINE

## 2019-05-12 NOTE — PATIENT INSTRUCTIONS
Return to Clinic for worsening symptoms or signs of infection    Detached Fingernail or Toenail  A detached nail is when the nail becomes  from the area underneath it (the nail bed). This often means losing all or part of the nail. An injury to your finger or toe is often the cause. It can also be caused by an infection around or under the nail.  Sometimes there is a cut in the nail bed or a bone fracture under the nail. In most cases, the nail will grow back from the area under the cuticle (the matrix). A fingernail takes about 4 to 6 months to grow back. A toenail takes about 12 months to grow back. If the nail bed or matrix was damaged, the nail may grow back with a rough or abnormal shape. In some cases the nail may not grow back at all.    There may be damage or a cut to the nail bed. This may need to be repaired. Often this is done with stitches. If the nail is still in good condition, it might be cleaned and trimmed, then put back in place. This is done to help and protect the new nail as it grows back. It also prevents the nail bed from drying out.  Home care  · Keep the injured part raised to reduce pain and swelling. This is important, especially in the first 48 hours.  · Apply an ice pack for up to 20 minutes. Do this every 3 to 6 hours during the first 24 to 48 hours. Keep using ice packs to ease pain and swelling as needed. To make an ice pack, put ice cubes in a plastic bag that seals at the top. Wrap the bag in a clean, thin towel or cloth. Never put ice or an ice pack directly on the skin. The ice pack can be put right on a wrap, cast, or splint. As the ice melts, be careful not to get any wrap, cast, or splint wet.  · The nail bed is moist, soft, and sensitive. It needs to be protected from injury for the first 7 to 10 days until it dries out and becomes hard. Keep it covered with a nonstick dressing or a bandage without adhesive.  · When a dressing is placed on an exposed nail bed, it  may stick and be hard to remove if left in place more than 24 hours. Unless you were told otherwise, change dressings every 24 hours. If needed, soak the dressing off while holding it under warm running water. Then lightly pat the wound dry. Apply a layer of antibiotic ointment before putting on the new dressing or bandage. This will help keep it from sticking. Use a nonstick dressing with the antibiotic ointment under the outer dressing.  · If an X-ray showed that you have a fracture, it will take about 4 weeks for this to heal. The injured part should be protected with a splint or tape while it is healing.  · If you were given antibiotics to prevent infection, take them as directed until they are all gone.  Medicine  · You can take over-the-counter medicine for pain, unless you were given a different pain medicine to use. Talk with your provider before using these medicines if you have chronic liver or kidney disease. Also talk with your provider if you ever had a stomach ulcer or GI bleeding, or are taking blood thinner medicines.  · If you were given antibiotics, take them until they are done. It is important to finish the antibiotics even if the wound looks better. This is to make sure the infection has cleared.  Follow-up care  Follow up with your healthcare provider, or as advised. If X-rays were taken, you will be told of any new findings that may affect your care.  When to seek medical advice  Call your healthcare provider right away if any of these occur:  · Pain or swelling increases  · Redness around the nail  · Pus (creamy white or yellow fluid) draining from the nail  · Fever of 100.4ºF (38ºC) or higher, or as directed by your provider  Date Last Reviewed: 8/1/2016  © 8880-4054 [employer]. [address] [place] [address], [place] [address]. All rights reserved. This information is not intended as a substitute for professional medical care. Always follow your healthcare professional's  instructions.

## 2019-05-12 NOTE — PROGRESS NOTES
"Subjective:       Patient ID: Thuan Reynolds Jr. is a 39 y.o. male.    Vitals:    05/12/19 1626   BP: (!) 146/86   Pulse: 80   Resp: 16   Temp: 97.5 °F (36.4 °C)   SpO2: 95%   Weight: 103.4 kg (228 lb)   Height: 5' 9" (1.753 m)       Chief Complaint: Toe Injury    Pt states he was wearing flip flops this am and stubbed his right great toe injuring the toe nail.    Toe Injury   This is a new problem. The current episode started today. The problem occurs constantly. The problem has been unchanged. Pertinent negatives include no abdominal pain, chest pain, chills, fever, headaches, nausea, rash, sore throat or vomiting. Nothing aggravates the symptoms. He has tried nothing for the symptoms.     Review of Systems   Constitution: Negative for chills and fever.   HENT: Negative for sore throat.    Eyes: Negative for blurred vision.   Cardiovascular: Negative for chest pain.   Respiratory: Negative for shortness of breath.    Skin: Negative for rash.   Musculoskeletal: Positive for joint pain. Negative for back pain.   Gastrointestinal: Negative for abdominal pain, diarrhea, nausea and vomiting.   Neurological: Negative for headaches.   Psychiatric/Behavioral: The patient is not nervous/anxious.        Objective:      Physical Exam   Constitutional: He is oriented to person, place, and time. He appears well-developed and well-nourished.   HENT:   Head: Normocephalic and atraumatic. Head is without abrasion, without contusion and without laceration.   Right Ear: External ear normal.   Left Ear: External ear normal.   Nose: Nose normal.   Mouth/Throat: Oropharynx is clear and moist.   Eyes: Pupils are equal, round, and reactive to light. Conjunctivae, EOM and lids are normal.   Neck: Trachea normal, full passive range of motion without pain and phonation normal. Neck supple.   Cardiovascular: Normal rate, regular rhythm and normal heart sounds.   Pulmonary/Chest: Effort normal and breath sounds normal. No stridor. No " respiratory distress.   Musculoskeletal: Normal range of motion.        Right foot: There is tenderness and deformity.        Left foot: There is normal range of motion, no tenderness, no bony tenderness, no swelling, normal capillary refill, no crepitus, no deformity and no laceration.        Feet:    Patient has a avulsed toenail to the right great toe is no bony tenderness or deformity no active bleeding present  Neuro-vascularly intact distal to extremity     Neurological: He is alert and oriented to person, place, and time.   Skin: Skin is warm, dry and intact. Capillary refill takes less than 2 seconds. No abrasion, no bruising, no burn, no ecchymosis, no laceration, no lesion and no rash noted. No erythema.   Psychiatric: He has a normal mood and affect. His speech is normal and behavior is normal. Judgment and thought content normal. Cognition and memory are normal.   Nursing note and vitals reviewed.      Assessment:       1. Nail avulsion of toe, initial encounter        Plan:       Thuan was seen today for toe injury.    Diagnoses and all orders for this visit:    Nail avulsion of toe, initial encounter      Toenail removal by Dr. Gaines  Anesthesia:  None  Hemostats used to manually remove the toenail from nail bed  Patient tolerated procedure well  Nail bed dressed with Bactroban ointment and a sterile dressing      Patient Instructions     Return to Clinic for worsening symptoms or signs of infection    Detached Fingernail or Toenail  A detached nail is when the nail becomes  from the area underneath it (the nail bed). This often means losing all or part of the nail. An injury to your finger or toe is often the cause. It can also be caused by an infection around or under the nail.  Sometimes there is a cut in the nail bed or a bone fracture under the nail. In most cases, the nail will grow back from the area under the cuticle (the matrix). A fingernail takes about 4 to 6 months to grow back.  A toenail takes about 12 months to grow back. If the nail bed or matrix was damaged, the nail may grow back with a rough or abnormal shape. In some cases the nail may not grow back at all.    There may be damage or a cut to the nail bed. This may need to be repaired. Often this is done with stitches. If the nail is still in good condition, it might be cleaned and trimmed, then put back in place. This is done to help and protect the new nail as it grows back. It also prevents the nail bed from drying out.  Home care  · Keep the injured part raised to reduce pain and swelling. This is important, especially in the first 48 hours.  · Apply an ice pack for up to 20 minutes. Do this every 3 to 6 hours during the first 24 to 48 hours. Keep using ice packs to ease pain and swelling as needed. To make an ice pack, put ice cubes in a plastic bag that seals at the top. Wrap the bag in a clean, thin towel or cloth. Never put ice or an ice pack directly on the skin. The ice pack can be put right on a wrap, cast, or splint. As the ice melts, be careful not to get any wrap, cast, or splint wet.  · The nail bed is moist, soft, and sensitive. It needs to be protected from injury for the first 7 to 10 days until it dries out and becomes hard. Keep it covered with a nonstick dressing or a bandage without adhesive.  · When a dressing is placed on an exposed nail bed, it may stick and be hard to remove if left in place more than 24 hours. Unless you were told otherwise, change dressings every 24 hours. If needed, soak the dressing off while holding it under warm running water. Then lightly pat the wound dry. Apply a layer of antibiotic ointment before putting on the new dressing or bandage. This will help keep it from sticking. Use a nonstick dressing with the antibiotic ointment under the outer dressing.  · If an X-ray showed that you have a fracture, it will take about 4 weeks for this to heal. The injured part should be protected  with a splint or tape while it is healing.  · If you were given antibiotics to prevent infection, take them as directed until they are all gone.  Medicine  · You can take over-the-counter medicine for pain, unless you were given a different pain medicine to use. Talk with your provider before using these medicines if you have chronic liver or kidney disease. Also talk with your provider if you ever had a stomach ulcer or GI bleeding, or are taking blood thinner medicines.  · If you were given antibiotics, take them until they are done. It is important to finish the antibiotics even if the wound looks better. This is to make sure the infection has cleared.  Follow-up care  Follow up with your healthcare provider, or as advised. If X-rays were taken, you will be told of any new findings that may affect your care.  When to seek medical advice  Call your healthcare provider right away if any of these occur:  · Pain or swelling increases  · Redness around the nail  · Pus (creamy white or yellow fluid) draining from the nail  · Fever of 100.4ºF (38ºC) or higher, or as directed by your provider  Date Last Reviewed: 8/1/2016  © 2340-1017 [employer]. [address] [place] [address], [place] [address]. All rights reserved. This information is not intended as a substitute for professional medical care. Always follow your healthcare professional's instructions.

## 2020-01-15 ENCOUNTER — LAB VISIT (OUTPATIENT)
Dept: LAB | Facility: HOSPITAL | Age: 40
End: 2020-01-15
Attending: INTERNAL MEDICINE
Payer: COMMERCIAL

## 2020-01-15 ENCOUNTER — OFFICE VISIT (OUTPATIENT)
Dept: INTERNAL MEDICINE | Facility: CLINIC | Age: 40
End: 2020-01-15
Payer: COMMERCIAL

## 2020-01-15 ENCOUNTER — PATIENT MESSAGE (OUTPATIENT)
Dept: INTERNAL MEDICINE | Facility: CLINIC | Age: 40
End: 2020-01-15

## 2020-01-15 VITALS
HEART RATE: 74 BPM | RESPIRATION RATE: 14 BRPM | DIASTOLIC BLOOD PRESSURE: 70 MMHG | HEIGHT: 69 IN | TEMPERATURE: 98 F | BODY MASS INDEX: 33.73 KG/M2 | SYSTOLIC BLOOD PRESSURE: 122 MMHG | WEIGHT: 227.75 LBS

## 2020-01-15 DIAGNOSIS — Z00.00 ANNUAL PHYSICAL EXAM: ICD-10-CM

## 2020-01-15 DIAGNOSIS — N46.01: ICD-10-CM

## 2020-01-15 DIAGNOSIS — Z00.00 ANNUAL PHYSICAL EXAM: Primary | ICD-10-CM

## 2020-01-15 DIAGNOSIS — Z87.898 HISTORY OF SEIZURE: Chronic | ICD-10-CM

## 2020-01-15 DIAGNOSIS — Z86.79 HISTORY OF SUBDURAL HEMATOMA: Chronic | ICD-10-CM

## 2020-01-15 PROBLEM — I60.9 SAH (SUBARACHNOID HEMORRHAGE): Status: RESOLVED | Noted: 2018-01-17 | Resolved: 2020-01-15

## 2020-01-15 LAB
ALBUMIN SERPL BCP-MCNC: 3.9 G/DL (ref 3.5–5.2)
ALP SERPL-CCNC: 54 U/L (ref 55–135)
ALT SERPL W/O P-5'-P-CCNC: 23 U/L (ref 10–44)
ANION GAP SERPL CALC-SCNC: 6 MMOL/L (ref 8–16)
AST SERPL-CCNC: 20 U/L (ref 10–40)
BASOPHILS # BLD AUTO: 0.02 K/UL (ref 0–0.2)
BASOPHILS NFR BLD: 0.3 % (ref 0–1.9)
BILIRUB SERPL-MCNC: 0.3 MG/DL (ref 0.1–1)
BUN SERPL-MCNC: 15 MG/DL (ref 6–20)
CALCIUM SERPL-MCNC: 9.2 MG/DL (ref 8.7–10.5)
CHLORIDE SERPL-SCNC: 104 MMOL/L (ref 95–110)
CHOLEST SERPL-MCNC: 142 MG/DL (ref 120–199)
CHOLEST/HDLC SERPL: 3 {RATIO} (ref 2–5)
CO2 SERPL-SCNC: 28 MMOL/L (ref 23–29)
CREAT SERPL-MCNC: 0.8 MG/DL (ref 0.5–1.4)
DIFFERENTIAL METHOD: ABNORMAL
EOSINOPHIL # BLD AUTO: 0.2 K/UL (ref 0–0.5)
EOSINOPHIL NFR BLD: 3.5 % (ref 0–8)
ERYTHROCYTE [DISTWIDTH] IN BLOOD BY AUTOMATED COUNT: 12.6 % (ref 11.5–14.5)
EST. GFR  (AFRICAN AMERICAN): >60 ML/MIN/1.73 M^2
EST. GFR  (NON AFRICAN AMERICAN): >60 ML/MIN/1.73 M^2
GLUCOSE SERPL-MCNC: 92 MG/DL (ref 70–110)
HCT VFR BLD AUTO: 44.8 % (ref 40–54)
HDLC SERPL-MCNC: 47 MG/DL (ref 40–75)
HDLC SERPL: 33.1 % (ref 20–50)
HGB BLD-MCNC: 15.1 G/DL (ref 14–18)
IMM GRANULOCYTES # BLD AUTO: 0.02 K/UL (ref 0–0.04)
IMM GRANULOCYTES NFR BLD AUTO: 0.3 % (ref 0–0.5)
LDLC SERPL CALC-MCNC: 81.4 MG/DL (ref 63–159)
LYMPHOCYTES # BLD AUTO: 1.9 K/UL (ref 1–4.8)
LYMPHOCYTES NFR BLD: 33 % (ref 18–48)
MCH RBC QN AUTO: 31.7 PG (ref 27–31)
MCHC RBC AUTO-ENTMCNC: 33.7 G/DL (ref 32–36)
MCV RBC AUTO: 94 FL (ref 82–98)
MONOCYTES # BLD AUTO: 0.5 K/UL (ref 0.3–1)
MONOCYTES NFR BLD: 7.8 % (ref 4–15)
NEUTROPHILS # BLD AUTO: 3.2 K/UL (ref 1.8–7.7)
NEUTROPHILS NFR BLD: 55.1 % (ref 38–73)
NONHDLC SERPL-MCNC: 95 MG/DL
NRBC BLD-RTO: 0 /100 WBC
PLATELET # BLD AUTO: 303 K/UL (ref 150–350)
PMV BLD AUTO: 9.7 FL (ref 9.2–12.9)
POTASSIUM SERPL-SCNC: 4.4 MMOL/L (ref 3.5–5.1)
PROT SERPL-MCNC: 6.9 G/DL (ref 6–8.4)
RBC # BLD AUTO: 4.76 M/UL (ref 4.6–6.2)
SODIUM SERPL-SCNC: 138 MMOL/L (ref 136–145)
TRIGL SERPL-MCNC: 68 MG/DL (ref 30–150)
TSH SERPL DL<=0.005 MIU/L-ACNC: 1.05 UIU/ML (ref 0.4–4)
WBC # BLD AUTO: 5.78 K/UL (ref 3.9–12.7)

## 2020-01-15 PROCEDURE — 84443 ASSAY THYROID STIM HORMONE: CPT

## 2020-01-15 PROCEDURE — 99385 PR PREVENTIVE VISIT,NEW,18-39: ICD-10-PCS | Mod: S$GLB,,, | Performed by: INTERNAL MEDICINE

## 2020-01-15 PROCEDURE — 85025 COMPLETE CBC W/AUTO DIFF WBC: CPT

## 2020-01-15 PROCEDURE — 99999 PR PBB SHADOW E&M-EST. PATIENT-LVL III: ICD-10-PCS | Mod: PBBFAC,,, | Performed by: INTERNAL MEDICINE

## 2020-01-15 PROCEDURE — 99385 PREV VISIT NEW AGE 18-39: CPT | Mod: S$GLB,,, | Performed by: INTERNAL MEDICINE

## 2020-01-15 PROCEDURE — 36415 COLL VENOUS BLD VENIPUNCTURE: CPT | Mod: PO

## 2020-01-15 PROCEDURE — 99999 PR PBB SHADOW E&M-EST. PATIENT-LVL III: CPT | Mod: PBBFAC,,, | Performed by: INTERNAL MEDICINE

## 2020-01-15 PROCEDURE — 80061 LIPID PANEL: CPT

## 2020-01-15 PROCEDURE — 80053 COMPREHEN METABOLIC PANEL: CPT

## 2020-01-15 RX ORDER — VARENICLINE TARTRATE 1 MG/1
1 TABLET, FILM COATED ORAL 2 TIMES DAILY
Qty: 60 TABLET | Refills: 2 | Status: SHIPPED | OUTPATIENT
Start: 2020-01-15 | End: 2020-02-14

## 2020-01-15 RX ORDER — VARENICLINE TARTRATE 0.5 MG/1
0.5 TABLET, FILM COATED ORAL 2 TIMES DAILY
Qty: 9 TABLET | Refills: 0 | Status: SHIPPED | OUTPATIENT
Start: 2020-01-15 | End: 2023-10-13

## 2020-04-21 DIAGNOSIS — Z01.84 ANTIBODY RESPONSE EXAMINATION: ICD-10-CM

## 2020-04-23 ENCOUNTER — LAB VISIT (OUTPATIENT)
Dept: LAB | Facility: HOSPITAL | Age: 40
End: 2020-04-23
Attending: INTERNAL MEDICINE
Payer: COMMERCIAL

## 2020-04-23 DIAGNOSIS — Z01.84 ANTIBODY RESPONSE EXAMINATION: ICD-10-CM

## 2020-04-23 LAB — SARS-COV-2 IGG SERPL QL IA: NEGATIVE

## 2020-04-23 PROCEDURE — 36415 COLL VENOUS BLD VENIPUNCTURE: CPT

## 2020-04-23 PROCEDURE — 86769 SARS-COV-2 COVID-19 ANTIBODY: CPT

## 2020-12-19 ENCOUNTER — IMMUNIZATION (OUTPATIENT)
Dept: INTERNAL MEDICINE | Facility: CLINIC | Age: 40
End: 2020-12-19
Payer: COMMERCIAL

## 2020-12-19 DIAGNOSIS — Z23 NEED FOR VACCINATION: ICD-10-CM

## 2020-12-19 PROCEDURE — 0001A COVID-19, MRNA, LNP-S, PF, 30 MCG/0.3 ML DOSE VACCINE: CPT | Mod: CV19,,, | Performed by: INTERNAL MEDICINE

## 2020-12-19 PROCEDURE — 0001A COVID-19, MRNA, LNP-S, PF, 30 MCG/0.3 ML DOSE VACCINE: ICD-10-PCS | Mod: CV19,,, | Performed by: INTERNAL MEDICINE

## 2020-12-19 PROCEDURE — 91300 COVID-19, MRNA, LNP-S, PF, 30 MCG/0.3 ML DOSE VACCINE: ICD-10-PCS | Mod: ,,, | Performed by: INTERNAL MEDICINE

## 2020-12-19 PROCEDURE — 91300 COVID-19, MRNA, LNP-S, PF, 30 MCG/0.3 ML DOSE VACCINE: CPT | Mod: ,,, | Performed by: INTERNAL MEDICINE

## 2021-01-09 ENCOUNTER — IMMUNIZATION (OUTPATIENT)
Dept: INTERNAL MEDICINE | Facility: CLINIC | Age: 41
End: 2021-01-09
Payer: COMMERCIAL

## 2021-01-09 DIAGNOSIS — Z23 NEED FOR VACCINATION: ICD-10-CM

## 2021-01-09 PROCEDURE — 0002A COVID-19, MRNA, LNP-S, PF, 30 MCG/0.3 ML DOSE VACCINE: CPT | Mod: PBBFAC | Performed by: INTERNAL MEDICINE

## 2021-01-09 PROCEDURE — 91300 COVID-19, MRNA, LNP-S, PF, 30 MCG/0.3 ML DOSE VACCINE: CPT | Mod: PBBFAC | Performed by: INTERNAL MEDICINE

## 2021-04-05 ENCOUNTER — PATIENT MESSAGE (OUTPATIENT)
Dept: ADMINISTRATIVE | Facility: HOSPITAL | Age: 41
End: 2021-04-05

## 2021-07-06 ENCOUNTER — PATIENT MESSAGE (OUTPATIENT)
Dept: ADMINISTRATIVE | Facility: HOSPITAL | Age: 41
End: 2021-07-06

## 2021-10-04 ENCOUNTER — PATIENT MESSAGE (OUTPATIENT)
Dept: ADMINISTRATIVE | Facility: HOSPITAL | Age: 41
End: 2021-10-04

## 2021-12-28 ENCOUNTER — IMMUNIZATION (OUTPATIENT)
Dept: PRIMARY CARE CLINIC | Facility: CLINIC | Age: 41
End: 2021-12-28
Payer: COMMERCIAL

## 2021-12-28 DIAGNOSIS — Z23 NEED FOR VACCINATION: Primary | ICD-10-CM

## 2021-12-28 PROCEDURE — 0004A COVID-19, MRNA, LNP-S, PF, 30 MCG/0.3 ML DOSE VACCINE: CPT | Mod: CV19,PBBFAC | Performed by: INTERNAL MEDICINE

## 2022-01-18 ENCOUNTER — PATIENT MESSAGE (OUTPATIENT)
Dept: ADMINISTRATIVE | Facility: HOSPITAL | Age: 42
End: 2022-01-18
Payer: COMMERCIAL

## 2022-03-10 ENCOUNTER — CLINICAL SUPPORT (OUTPATIENT)
Dept: OTHER | Facility: CLINIC | Age: 42
End: 2022-03-10

## 2022-03-10 DIAGNOSIS — Z00.8 ENCOUNTER FOR OTHER GENERAL EXAMINATION: ICD-10-CM

## 2022-03-11 VITALS — BODY MASS INDEX: 33.63 KG/M2 | HEIGHT: 69 IN

## 2022-03-11 LAB
GLUCOSE SERPL-MCNC: 116 MG/DL (ref 60–140)
HDLC SERPL-MCNC: 49 MG/DL
POC CHOLESTEROL, LDL (DOCK): 123.2 MG/DL
POC CHOLESTEROL, TOTAL: 187 MG/DL
TRIGL SERPL-MCNC: 74 MG/DL

## 2022-03-16 ENCOUNTER — PATIENT MESSAGE (OUTPATIENT)
Dept: ADMINISTRATIVE | Facility: HOSPITAL | Age: 42
End: 2022-03-16
Payer: COMMERCIAL

## 2023-08-19 DIAGNOSIS — Z00.00 ANNUAL PHYSICAL EXAM: Primary | ICD-10-CM

## 2023-09-28 ENCOUNTER — LAB VISIT (OUTPATIENT)
Dept: LAB | Facility: HOSPITAL | Age: 43
End: 2023-09-28
Attending: INTERNAL MEDICINE
Payer: COMMERCIAL

## 2023-09-28 DIAGNOSIS — Z00.00 ANNUAL PHYSICAL EXAM: ICD-10-CM

## 2023-09-28 LAB
ALBUMIN SERPL BCP-MCNC: 4.1 G/DL (ref 3.5–5.2)
ALP SERPL-CCNC: 63 U/L (ref 55–135)
ALT SERPL W/O P-5'-P-CCNC: 26 U/L (ref 10–44)
ANION GAP SERPL CALC-SCNC: 8 MMOL/L (ref 8–16)
AST SERPL-CCNC: 20 U/L (ref 10–40)
BASOPHILS # BLD AUTO: 0.02 K/UL (ref 0–0.2)
BASOPHILS NFR BLD: 0.3 % (ref 0–1.9)
BILIRUB SERPL-MCNC: 0.6 MG/DL (ref 0.1–1)
BUN SERPL-MCNC: 14 MG/DL (ref 6–20)
CALCIUM SERPL-MCNC: 9.1 MG/DL (ref 8.7–10.5)
CHLORIDE SERPL-SCNC: 101 MMOL/L (ref 95–110)
CHOLEST SERPL-MCNC: 186 MG/DL (ref 120–199)
CHOLEST/HDLC SERPL: 3.8 {RATIO} (ref 2–5)
CO2 SERPL-SCNC: 28 MMOL/L (ref 23–29)
COMPLEXED PSA SERPL-MCNC: 1 NG/ML (ref 0–4)
CREAT SERPL-MCNC: 0.9 MG/DL (ref 0.5–1.4)
DIFFERENTIAL METHOD: ABNORMAL
EOSINOPHIL # BLD AUTO: 0.2 K/UL (ref 0–0.5)
EOSINOPHIL NFR BLD: 2.9 % (ref 0–8)
ERYTHROCYTE [DISTWIDTH] IN BLOOD BY AUTOMATED COUNT: 13.1 % (ref 11.5–14.5)
EST. GFR  (NO RACE VARIABLE): >60 ML/MIN/1.73 M^2
ESTIMATED AVG GLUCOSE: 91 MG/DL (ref 68–131)
GLUCOSE SERPL-MCNC: 87 MG/DL (ref 70–110)
HBA1C MFR BLD: 4.8 % (ref 4–5.6)
HCT VFR BLD AUTO: 43.3 % (ref 40–54)
HDLC SERPL-MCNC: 49 MG/DL (ref 40–75)
HDLC SERPL: 26.3 % (ref 20–50)
HGB BLD-MCNC: 15.1 G/DL (ref 14–18)
IMM GRANULOCYTES # BLD AUTO: 0.02 K/UL (ref 0–0.04)
IMM GRANULOCYTES NFR BLD AUTO: 0.3 % (ref 0–0.5)
LDLC SERPL CALC-MCNC: 116.2 MG/DL (ref 63–159)
LYMPHOCYTES # BLD AUTO: 1.6 K/UL (ref 1–4.8)
LYMPHOCYTES NFR BLD: 21.3 % (ref 18–48)
MCH RBC QN AUTO: 32.7 PG (ref 27–31)
MCHC RBC AUTO-ENTMCNC: 34.9 G/DL (ref 32–36)
MCV RBC AUTO: 94 FL (ref 82–98)
MONOCYTES # BLD AUTO: 0.7 K/UL (ref 0.3–1)
MONOCYTES NFR BLD: 8.9 % (ref 4–15)
NEUTROPHILS # BLD AUTO: 4.9 K/UL (ref 1.8–7.7)
NEUTROPHILS NFR BLD: 66.3 % (ref 38–73)
NONHDLC SERPL-MCNC: 137 MG/DL
NRBC BLD-RTO: 0 /100 WBC
PLATELET # BLD AUTO: 330 K/UL (ref 150–450)
PMV BLD AUTO: 10 FL (ref 9.2–12.9)
POTASSIUM SERPL-SCNC: 4.6 MMOL/L (ref 3.5–5.1)
PROT SERPL-MCNC: 7.2 G/DL (ref 6–8.4)
RBC # BLD AUTO: 4.62 M/UL (ref 4.6–6.2)
SODIUM SERPL-SCNC: 137 MMOL/L (ref 136–145)
TRIGL SERPL-MCNC: 104 MG/DL (ref 30–150)
TSH SERPL DL<=0.005 MIU/L-ACNC: 1.48 UIU/ML (ref 0.4–4)
WBC # BLD AUTO: 7.31 K/UL (ref 3.9–12.7)

## 2023-09-28 PROCEDURE — 85025 COMPLETE CBC W/AUTO DIFF WBC: CPT | Performed by: INTERNAL MEDICINE

## 2023-09-28 PROCEDURE — 80061 LIPID PANEL: CPT | Performed by: INTERNAL MEDICINE

## 2023-09-28 PROCEDURE — 84153 ASSAY OF PSA TOTAL: CPT | Performed by: INTERNAL MEDICINE

## 2023-09-28 PROCEDURE — 83036 HEMOGLOBIN GLYCOSYLATED A1C: CPT | Performed by: INTERNAL MEDICINE

## 2023-09-28 PROCEDURE — 80053 COMPREHEN METABOLIC PANEL: CPT | Performed by: INTERNAL MEDICINE

## 2023-09-28 PROCEDURE — 36415 COLL VENOUS BLD VENIPUNCTURE: CPT | Mod: PO | Performed by: INTERNAL MEDICINE

## 2023-09-28 PROCEDURE — 84443 ASSAY THYROID STIM HORMONE: CPT | Performed by: INTERNAL MEDICINE

## 2023-10-13 ENCOUNTER — OFFICE VISIT (OUTPATIENT)
Dept: INTERNAL MEDICINE | Facility: CLINIC | Age: 43
End: 2023-10-13
Payer: COMMERCIAL

## 2023-10-13 VITALS
BODY MASS INDEX: 33.27 KG/M2 | HEIGHT: 69 IN | WEIGHT: 224.63 LBS | DIASTOLIC BLOOD PRESSURE: 76 MMHG | SYSTOLIC BLOOD PRESSURE: 122 MMHG | TEMPERATURE: 98 F | HEART RATE: 60 BPM | RESPIRATION RATE: 18 BRPM | OXYGEN SATURATION: 97 %

## 2023-10-13 DIAGNOSIS — Z00.00 ANNUAL PHYSICAL EXAM: Primary | ICD-10-CM

## 2023-10-13 DIAGNOSIS — Z72.0 TOBACCO ABUSE: ICD-10-CM

## 2023-10-13 DIAGNOSIS — I83.90 VARICOSE VEINS OF LOWER EXTREMITY, UNSPECIFIED LATERALITY, UNSPECIFIED WHETHER COMPLICATED: ICD-10-CM

## 2023-10-13 PROCEDURE — 3044F HG A1C LEVEL LT 7.0%: CPT | Mod: CPTII,S$GLB,, | Performed by: INTERNAL MEDICINE

## 2023-10-13 PROCEDURE — 1160F PR REVIEW ALL MEDS BY PRESCRIBER/CLIN PHARMACIST DOCUMENTED: ICD-10-PCS | Mod: CPTII,S$GLB,, | Performed by: INTERNAL MEDICINE

## 2023-10-13 PROCEDURE — 3008F PR BODY MASS INDEX (BMI) DOCUMENTED: ICD-10-PCS | Mod: CPTII,S$GLB,, | Performed by: INTERNAL MEDICINE

## 2023-10-13 PROCEDURE — 99386 PREV VISIT NEW AGE 40-64: CPT | Mod: S$GLB,,, | Performed by: INTERNAL MEDICINE

## 2023-10-13 PROCEDURE — 1159F MED LIST DOCD IN RCRD: CPT | Mod: CPTII,S$GLB,, | Performed by: INTERNAL MEDICINE

## 2023-10-13 PROCEDURE — 99999 PR PBB SHADOW E&M-EST. PATIENT-LVL IV: ICD-10-PCS | Mod: PBBFAC,,, | Performed by: INTERNAL MEDICINE

## 2023-10-13 PROCEDURE — 1160F RVW MEDS BY RX/DR IN RCRD: CPT | Mod: CPTII,S$GLB,, | Performed by: INTERNAL MEDICINE

## 2023-10-13 PROCEDURE — 3008F BODY MASS INDEX DOCD: CPT | Mod: CPTII,S$GLB,, | Performed by: INTERNAL MEDICINE

## 2023-10-13 PROCEDURE — 3078F DIAST BP <80 MM HG: CPT | Mod: CPTII,S$GLB,, | Performed by: INTERNAL MEDICINE

## 2023-10-13 PROCEDURE — 3074F PR MOST RECENT SYSTOLIC BLOOD PRESSURE < 130 MM HG: ICD-10-PCS | Mod: CPTII,S$GLB,, | Performed by: INTERNAL MEDICINE

## 2023-10-13 PROCEDURE — 3074F SYST BP LT 130 MM HG: CPT | Mod: CPTII,S$GLB,, | Performed by: INTERNAL MEDICINE

## 2023-10-13 PROCEDURE — 99386 PR PREVENTIVE VISIT,NEW,40-64: ICD-10-PCS | Mod: S$GLB,,, | Performed by: INTERNAL MEDICINE

## 2023-10-13 PROCEDURE — 3044F PR MOST RECENT HEMOGLOBIN A1C LEVEL <7.0%: ICD-10-PCS | Mod: CPTII,S$GLB,, | Performed by: INTERNAL MEDICINE

## 2023-10-13 PROCEDURE — 99999 PR PBB SHADOW E&M-EST. PATIENT-LVL IV: CPT | Mod: PBBFAC,,, | Performed by: INTERNAL MEDICINE

## 2023-10-13 PROCEDURE — 3078F PR MOST RECENT DIASTOLIC BLOOD PRESSURE < 80 MM HG: ICD-10-PCS | Mod: CPTII,S$GLB,, | Performed by: INTERNAL MEDICINE

## 2023-10-13 PROCEDURE — 1159F PR MEDICATION LIST DOCUMENTED IN MEDICAL RECORD: ICD-10-PCS | Mod: CPTII,S$GLB,, | Performed by: INTERNAL MEDICINE

## 2023-10-13 RX ORDER — VARENICLINE TARTRATE 0.5 MG/1
0.5 TABLET, FILM COATED ORAL 2 TIMES DAILY
Qty: 9 TABLET | Refills: 0 | Status: SHIPPED | OUTPATIENT
Start: 2023-10-13

## 2023-10-13 RX ORDER — VARENICLINE TARTRATE 1 MG/1
1 TABLET, FILM COATED ORAL 2 TIMES DAILY
Qty: 60 TABLET | Refills: 2 | Status: SHIPPED | OUTPATIENT
Start: 2023-10-13 | End: 2024-01-11

## 2023-10-13 NOTE — PROGRESS NOTES
Subjective:       Patient ID: Thuan Reynolds Jr. is a 43 y.o. male.    Chief Complaint: Annual Exam    HPI    43 y.o. male here for annual exam.     Cholesterol: WNL  Vaccines: Influenza - 2023; Tetanus - 2018; COVID - 3 done  Sexual Screening: active  STD screening: no concern  Eye exam: done last 2 months ago  Prostate: 1  Colonoscopy: no family history of cancer  A1c: 4.8    Exercise: walks 2-2.5 miles in the am.  Diet: mix of fast food, home cooked, eating out    Mom passed January 2021 from a bowel obstruction.  She was an alcoholic. Her body was too frail to recovery from the surgery.    He has a varicose vein that itches at times.    Past Medical History:   Diagnosis Date    Bell's palsy     Otitis media     SDH (subdural hematoma)     BILAT SDH/SAH    Seizure     Sertoli cell-only syndrome     Skull vault fx      Past Surgical History:   Procedure Laterality Date    VARIOCOCELE REPAIR  2008    left side     Social History     Socioeconomic History    Marital status:    Tobacco Use    Smoking status: Every Day     Current packs/day: 0.00     Types: Cigarettes    Smokeless tobacco: Never    Tobacco comments:     1 pack a week   Substance and Sexual Activity    Alcohol use: Yes     Alcohol/week: 5.0 standard drinks of alcohol     Types: 5 Cans of beer per week     Comment: once a week; 2-3 beers    Drug use: No    Sexual activity: Yes     Partners: Female     Birth control/protection: None     Comment:      Social Determinants of Health     Financial Resource Strain: Low Risk  (10/10/2023)    Overall Financial Resource Strain (CARDIA)     Difficulty of Paying Living Expenses: Not hard at all   Food Insecurity: No Food Insecurity (10/10/2023)    Hunger Vital Sign     Worried About Running Out of Food in the Last Year: Never true     Ran Out of Food in the Last Year: Never true   Transportation Needs: No Transportation Needs (10/10/2023)    PRAPARE - Transportation     Lack of Transportation  (Medical): No     Lack of Transportation (Non-Medical): No   Physical Activity: Sufficiently Active (10/10/2023)    Exercise Vital Sign     Days of Exercise per Week: 6 days     Minutes of Exercise per Session: 60 min   Stress: No Stress Concern Present (10/10/2023)    [employer] - Occupational Stress Questionnaire     Feeling of Stress : Only a little   Social Connections: Unknown (10/10/2023)    Social Connection and Isolation Panel [NHANES]     Frequency of Communication with Friends and Family: More than three times a week     Frequency of Social Gatherings with Friends and Family: More than three times a week     Active Member of Clubs or Organizations: Yes     Attends Club or Organization Meetings: 1 to 4 times per year     Marital Status:    Housing Stability: Low Risk  (10/10/2023)    Housing Stability Vital Sign     Unable to Pay for Housing in the Last Year: No     Number of Places Lived in the Last Year: 1     Unstable Housing in the Last Year: No     Review of patient's allergies indicates:  No Known Allergies  Thuan Reynolds Jr. had no medications administered during this visit.    Review of Systems      Objective:      Physical Exam  Vitals reviewed.   Constitutional:       Appearance: He is well-developed.   HENT:      Head: Normocephalic and atraumatic.      Mouth/Throat:      Pharynx: No oropharyngeal exudate.   Eyes:      General: No scleral icterus.        Right eye: No discharge.         Left eye: No discharge.      Pupils: Pupils are equal, round, and reactive to light.   Neck:      Thyroid: No thyromegaly.      Trachea: No tracheal deviation.   Cardiovascular:      Rate and Rhythm: Normal rate and regular rhythm.      Heart sounds: Normal heart sounds. No murmur heard.     No friction rub. No gallop.   Pulmonary:      Effort: Pulmonary effort is normal. No respiratory distress.      Breath sounds: Normal breath sounds. No wheezing or rales.   Chest:       Chest wall: No tenderness.   Abdominal:      General: Bowel sounds are normal. There is no distension.      Palpations: Abdomen is soft. There is no mass.      Tenderness: There is no abdominal tenderness. There is no guarding or rebound.   Musculoskeletal:         General: No tenderness. Normal range of motion.      Cervical back: Normal range of motion and neck supple.   Skin:     General: Skin is warm and dry.      Coloration: Skin is not pale.      Findings: No erythema or rash.   Neurological:      Mental Status: He is alert and oriented to person, place, and time.   Psychiatric:         Behavior: Behavior normal.         Assessment:       1. Annual physical exam    2. Tobacco abuse    3. Varicose veins of lower extremity, unspecified laterality, unspecified whether complicated  - Ambulatory referral/consult to Vascular Medicine; Future      Plan:       1. Reviewed lab work with patient.  Discussed diet and exercise.  Discussed vaccines.  2.  Chantix prescribed.  3.  Refer to vascular Medicine.

## 2023-10-16 ENCOUNTER — OFFICE VISIT (OUTPATIENT)
Dept: CARDIOLOGY | Facility: CLINIC | Age: 43
End: 2023-10-16
Payer: COMMERCIAL

## 2023-10-16 VITALS
HEART RATE: 80 BPM | DIASTOLIC BLOOD PRESSURE: 90 MMHG | WEIGHT: 229.06 LBS | HEIGHT: 69 IN | SYSTOLIC BLOOD PRESSURE: 130 MMHG | BODY MASS INDEX: 33.93 KG/M2

## 2023-10-16 DIAGNOSIS — I83.12 VARICOSE VEINS OF BOTH LOWER EXTREMITIES WITH INFLAMMATION: Primary | ICD-10-CM

## 2023-10-16 DIAGNOSIS — I83.90 VARICOSE VEINS OF LOWER EXTREMITY, UNSPECIFIED LATERALITY, UNSPECIFIED WHETHER COMPLICATED: ICD-10-CM

## 2023-10-16 DIAGNOSIS — I89.0 LYMPHEDEMA OF BOTH LOWER EXTREMITIES: ICD-10-CM

## 2023-10-16 DIAGNOSIS — I83.11 VARICOSE VEINS OF BOTH LOWER EXTREMITIES WITH INFLAMMATION: Primary | ICD-10-CM

## 2023-10-16 PROCEDURE — 99999 PR PBB SHADOW E&M-EST. PATIENT-LVL III: CPT | Mod: PBBFAC,,, | Performed by: INTERNAL MEDICINE

## 2023-10-16 PROCEDURE — 3044F PR MOST RECENT HEMOGLOBIN A1C LEVEL <7.0%: ICD-10-PCS | Mod: CPTII,S$GLB,, | Performed by: INTERNAL MEDICINE

## 2023-10-16 PROCEDURE — 3075F PR MOST RECENT SYSTOLIC BLOOD PRESS GE 130-139MM HG: ICD-10-PCS | Mod: CPTII,S$GLB,, | Performed by: INTERNAL MEDICINE

## 2023-10-16 PROCEDURE — 3080F DIAST BP >= 90 MM HG: CPT | Mod: CPTII,S$GLB,, | Performed by: INTERNAL MEDICINE

## 2023-10-16 PROCEDURE — 3008F PR BODY MASS INDEX (BMI) DOCUMENTED: ICD-10-PCS | Mod: CPTII,S$GLB,, | Performed by: INTERNAL MEDICINE

## 2023-10-16 PROCEDURE — 3044F HG A1C LEVEL LT 7.0%: CPT | Mod: CPTII,S$GLB,, | Performed by: INTERNAL MEDICINE

## 2023-10-16 PROCEDURE — 3008F BODY MASS INDEX DOCD: CPT | Mod: CPTII,S$GLB,, | Performed by: INTERNAL MEDICINE

## 2023-10-16 PROCEDURE — 99205 PR OFFICE/OUTPT VISIT, NEW, LEVL V, 60-74 MIN: ICD-10-PCS | Mod: S$GLB,,, | Performed by: INTERNAL MEDICINE

## 2023-10-16 PROCEDURE — 99205 OFFICE O/P NEW HI 60 MIN: CPT | Mod: S$GLB,,, | Performed by: INTERNAL MEDICINE

## 2023-10-16 PROCEDURE — 1159F MED LIST DOCD IN RCRD: CPT | Mod: CPTII,S$GLB,, | Performed by: INTERNAL MEDICINE

## 2023-10-16 PROCEDURE — 1159F PR MEDICATION LIST DOCUMENTED IN MEDICAL RECORD: ICD-10-PCS | Mod: CPTII,S$GLB,, | Performed by: INTERNAL MEDICINE

## 2023-10-16 PROCEDURE — 99999 PR PBB SHADOW E&M-EST. PATIENT-LVL III: ICD-10-PCS | Mod: PBBFAC,,, | Performed by: INTERNAL MEDICINE

## 2023-10-16 PROCEDURE — 3075F SYST BP GE 130 - 139MM HG: CPT | Mod: CPTII,S$GLB,, | Performed by: INTERNAL MEDICINE

## 2023-10-16 PROCEDURE — 3080F PR MOST RECENT DIASTOLIC BLOOD PRESSURE >= 90 MM HG: ICD-10-PCS | Mod: CPTII,S$GLB,, | Performed by: INTERNAL MEDICINE

## 2023-10-16 RX ORDER — MULTIVITAMIN
1 TABLET ORAL DAILY
COMMUNITY

## 2023-10-16 NOTE — PATIENT INSTRUCTIONS
Assessment/Plan:  Thuan WRIGHT Gail Mcdonough. is a 43 y.o. male with Sertoli cell-only syndrome, obesity, who presents for initial appointment.    Varicose veins of lower extremity- Check BLE venous reflux study and ELLYN study.  If no severe PAD, start graduated compression hose.  Recommend wearing graduated compression hose.  Limit sodium intake to 2000 mg daily.  Limit volume intake to 1.5 L daily.  Elevate legs when resting.    2. Obesity- Encourage diet, exercise, and weight loss.     Follow up in 3 months

## 2023-10-16 NOTE — PROGRESS NOTES
[facility]      Chief Complaint   Patient presents with    Varicose Veins       Patient ID: Thuan Reynolds Jr. is a 43 y.o. male with Sertoli cell-only syndrome, obesity, who presents for initial appointment.  Pertinent history/events are as follows:     -Pt kindly referred by Dr. Corea for evaluation of varicose veins of the lower extremity.    HPI:  Mr. Reynolds reports a cluster of varicose veins at right calf area starting 2 years ago.  States the area itches at time.  He wears knee high compression hose at time.  No claudication or tissue loss.      Past Medical History:   Diagnosis Date    Bell's palsy     Otitis media     SDH (subdural hematoma)     BILAT SDH/SAH    Seizure     Sertoli cell-only syndrome     Skull vault fx      Past Surgical History:   Procedure Laterality Date    VARIOCOCELE REPAIR  2008    left side     Social History     Socioeconomic History    Marital status:    Tobacco Use    Smoking status: Every Day     Current packs/day: 0.00     Types: Cigarettes    Smokeless tobacco: Never    Tobacco comments:     1 pack a week   Substance and Sexual Activity    Alcohol use: Yes     Alcohol/week: 5.0 standard drinks of alcohol     Types: 5 Cans of beer per week     Comment: once a week; 2-3 beers    Drug use: No    Sexual activity: Yes     Partners: Female     Birth control/protection: None     Comment:      Social Determinants of Health     Financial Resource Strain: Low Risk  (10/15/2023)    Overall Financial Resource Strain (CARDIA)     Difficulty of Paying Living Expenses: Not hard at all   Food Insecurity: No Food Insecurity (10/15/2023)    Hunger Vital Sign     Worried About Running Out of Food in the Last Year: Never true     Ran Out of Food in the Last Year: Never true   Transportation Needs: No Transportation Needs (10/15/2023)    PRAPARE - Transportation     Lack of Transportation (Medical): No     Lack of Transportation (Non-Medical): No   Physical  Activity: Sufficiently Active (10/15/2023)    Exercise Vital Sign     Days of Exercise per Week: 6 days     Minutes of Exercise per Session: 60 min   Stress: No Stress Concern Present (10/15/2023)    [employer] [employer] - Occupational Stress Questionnaire     Feeling of Stress : Only a little   Social Connections: Unknown (10/15/2023)    Social Connection and Isolation Panel [NHANES]     Frequency of Communication with Friends and Family: More than three times a week     Frequency of Social Gatherings with Friends and Family: Three times a week     Active Member of Clubs or Organizations: Yes     Attends Club or Organization Meetings: 1 to 4 times per year     Marital Status:    Housing Stability: Low Risk  (10/15/2023)    Housing Stability Vital Sign     Unable to Pay for Housing in the Last Year: No     Number of Places Lived in the Last Year: 1     Unstable Housing in the Last Year: No     Family History   Problem Relation Age of Onset    Alcohol abuse Mother     Vision loss Mother     Macular degeneration Mother     Diabetes Father     Heart failure Father     Early death Father     Heart disease Father         MI in 40s and CABG    Hyperlipidemia Father     Hypertension Father     Liver disease Father     Alcohol abuse Father     Liver disease Paternal Uncle         primary sclerosing cholangitis    Cancer Maternal Grandfather         throat - smoker and drinker    Stroke Neg Hx        Review of patient's allergies indicates:  No Known Allergies    Medication List with Changes/Refills   Current Medications    MULTIVITAMIN (ONE DAILY MULTIVITAMIN) PER TABLET    Take 1 tablet by mouth once daily.    VARENICLINE (CHANTIX) 0.5 MG TAB    Take 1 tablet (0.5 mg total) by mouth 2 (two) times daily.    VARENICLINE (CHANTIX) 1 MG TAB    Take 1 tablet (1 mg total) by mouth 2 (two) times daily.       Review of Systems  Constitution: Denies chills, fever, and sweats.  HENT: Denies headaches or  "blurry vision.  Cardiovascular: Denies chest pain or irregular heart beat.  Respiratory: Denies cough or shortness of breath.  Gastrointestinal: Denies abdominal pain, nausea, or vomiting.  Musculoskeletal: Positive for varicose veins with itching at right leg.  Neurological: Denies dizziness or focal weakness.  Psychiatric/Behavioral: Normal mental status.  Hematologic/Lymphatic: Denies bleeding problem or easy bruising/bleeding.  Skin: Denies rash or suspicious lesions    Physical Examination  BP (!) 130/90   Pulse 80   Ht 5' 9" (1.753 m)   Wt 103.9 kg (229 lb 0.9 oz)   BMI 33.83 kg/m²     Constitutional: No acute distress, conversant  HEENT: Sclera anicteric, Pupils equal, round and reactive to light, extraocular motions intact, Oropharynx clear  Neck: No JVD, no carotid bruits  Cardiovascular: regular rate and rhythm, no murmur, rubs or gallops, normal S1/S2  Pulmonary: Clear to auscultation bilaterally  Abdominal: Abdomen soft, nontender, nondistended, positive bowel sounds  Extremities: RLE with cluster of varicose veins at calf  BLE's with no pitting edema and changes consistent with mild lymphedema  Pulses:  Carotid pulses are 2+ on the right side, and 2+ on the left side.  Radial pulses are 2+ on the right side, and 2+ on the left side.   Femoral pulses are 2+ on the right side, and 2+ on the left side.  Popliteal pulses are 2+ on the right side, and 2+ on the left side.   Dorsalis pedis pulses are 2+ on the right side, and 2+ on the left side.   Posterior tibial pulses are 2+ on the right side, and 2+ on the left side.    Skin: No ecchymosis, erythema, or ulcers  Psych: Alert and oriented x 3, appropriate affect  Neuro: CNII-XII intact, no focal deficits    Labs:  Most Recent Data  CBC:   Lab Results   Component Value Date    WBC 7.31 09/28/2023    HGB 15.1 09/28/2023    HCT 43.3 09/28/2023     09/28/2023    MCV 94 09/28/2023    RDW 13.1 09/28/2023     BMP:   Lab Results   Component Value Date " "    09/28/2023    K 4.6 09/28/2023     09/28/2023    CO2 28 09/28/2023    BUN 14 09/28/2023    CREATININE 0.9 09/28/2023    GLU 87 09/28/2023    CALCIUM 9.1 09/28/2023    MG 2.2 01/20/2018    PHOS 2.3 (L) 01/20/2018     LFTS;   Lab Results   Component Value Date    PROT 7.2 09/28/2023    ALBUMIN 4.1 09/28/2023    BILITOT 0.6 09/28/2023    AST 20 09/28/2023    ALKPHOS 63 09/28/2023    ALT 26 09/28/2023     COAGS:   Lab Results   Component Value Date    INR 1.0 01/17/2018     FLP:   Lab Results   Component Value Date    CHOL 186 09/28/2023    HDL 49 09/28/2023    LDLCALC 116.2 09/28/2023    TRIG 104 09/28/2023    CHOLHDL 26.3 09/28/2023     CARDIAC: No results found for: "TROPONINI", "CKTOTAL", "CKMB", "BNP"    Imaging:    Assessment/Plan:  Thuan Reynolds Jr. is a 43 y.o. male with Sertoli cell-only syndrome, obesity, who presents for initial appointment.    Varicose veins of lower extremity- Check BLE venous reflux study and ELLYN study.  If no severe PAD, start graduated compression hose.  Recommend wearing graduated compression hose.  Limit sodium intake to 2000 mg daily.  Limit volume intake to 1.5 L daily.  Elevate legs when resting.    2. Obesity- Encourage diet, exercise, and weight loss.     Follow up in 3 months     Total duration of face to face visit time 30 minutes.  Total time spent counseling greater than fifty percent of total visit time.  Counseling included discussion regarding imaging findings, diagnosis, possibilities, treatment options, risks and benefits.  The patient had many questions regarding the options and long-term effects.    Darryl Moncada MD, PhD  Interventional Cardiology      "

## 2023-10-31 ENCOUNTER — HOSPITAL ENCOUNTER (OUTPATIENT)
Dept: CARDIOLOGY | Facility: HOSPITAL | Age: 43
Discharge: HOME | End: 2023-10-31
Attending: INTERNAL MEDICINE
Payer: COMMERCIAL

## 2023-10-31 DIAGNOSIS — I83.11 VARICOSE VEINS OF BOTH LOWER EXTREMITIES WITH INFLAMMATION: ICD-10-CM

## 2023-10-31 DIAGNOSIS — I83.12 VARICOSE VEINS OF BOTH LOWER EXTREMITIES WITH INFLAMMATION: ICD-10-CM

## 2023-10-31 LAB
LEFT ABI: 1.28
LEFT ARM BP: 125 MMHG
LEFT DORSALIS PEDIS: 155 MMHG
LEFT GIAC DIA: 0.26 MM
LEFT GREAT SAPHENOUS DISTAL THIGH DIA: 0.38 CM
LEFT GREAT SAPHENOUS JUNCTION DIA: 0.38 CM
LEFT GREAT SAPHENOUS JUNCTION REFLUX: 833 MS
LEFT GREAT SAPHENOUS KNEE DIA: 0.4 CM
LEFT GREAT SAPHENOUS MIDDLE THIGH DIA: 0.36 CM
LEFT GREAT SAPHENOUS PROXIMAL CALF DIA: 0.12 CM
LEFT POSTERIOR TIBIAL: 160 MMHG
LEFT SMALL SAPHENOUS KNEE DIA: 0.18 CM
LEFT SMALL SAPHENOUS SPJ DIA: 0.13 CM
RIGHT ABI: 1.24
RIGHT ARM BP: 119 MMHG
RIGHT DORSALIS PEDIS: 150 MMHG
RIGHT GIAC DIA: 0.23 MM
RIGHT GIAC REF: 5018 MS
RIGHT GREAT SAPHENOUS DISTAL THIGH DIA: 0.57 CM
RIGHT GREAT SAPHENOUS DISTAL THIGH REFLUX: 4909 MS
RIGHT GREAT SAPHENOUS JUNCTION DIA: 0.57 CM
RIGHT GREAT SAPHENOUS JUNCTION REFLUX: 4635 MS
RIGHT GREAT SAPHENOUS KNEE DIA: 0.52 CM
RIGHT GREAT SAPHENOUS KNEE REFLUX: 4253 MS
RIGHT GREAT SAPHENOUS MIDDLE THIGH DIA: 0.46 CM
RIGHT GREAT SAPHENOUS MIDDLE THIGH REFLUX: 5343 MS
RIGHT GREAT SAPHENOUS PROXIMAL CALF DIA: 0.37 CM
RIGHT GREAT SAPHENOUS PROXIMAL CALF REFLUX: 5029 MS
RIGHT POSTERIOR TIBIAL: 155 MMHG
RIGHT SMALL SAPHENOUS KNEE DIA: 0.29 CM
RIGHT SMALL SAPHENOUS SPJ DIA: 0.23 CM
RIGHT SMALL SAPHENOUS SPJ REFLUX: 4287 MS

## 2023-10-31 PROCEDURE — 93970 EXTREMITY STUDY: CPT | Mod: 26,,, | Performed by: INTERNAL MEDICINE

## 2023-10-31 PROCEDURE — 93922 UPR/L XTREMITY ART 2 LEVELS: CPT | Mod: 26,,, | Performed by: INTERNAL MEDICINE

## 2023-10-31 PROCEDURE — 93970 CV US LOWER VENOUS INSUFFICIENCY BILATERAL (CUPID ONLY): ICD-10-PCS | Mod: 26,,, | Performed by: INTERNAL MEDICINE

## 2023-10-31 PROCEDURE — 93922 UPR/L XTREMITY ART 2 LEVELS: CPT | Mod: PO

## 2023-10-31 PROCEDURE — 93922 ANKLE BRACHIAL INDICES (ABI): ICD-10-PCS | Mod: 26,,, | Performed by: INTERNAL MEDICINE

## 2023-10-31 PROCEDURE — 93970 EXTREMITY STUDY: CPT | Mod: TC,PO

## 2024-01-19 ENCOUNTER — OFFICE VISIT (OUTPATIENT)
Dept: URGENT CARE | Facility: CLINIC | Age: 44
End: 2024-01-19
Payer: COMMERCIAL

## 2024-01-19 VITALS
SYSTOLIC BLOOD PRESSURE: 120 MMHG | BODY MASS INDEX: 32.58 KG/M2 | TEMPERATURE: 98 F | WEIGHT: 220 LBS | HEIGHT: 69 IN | HEART RATE: 70 BPM | DIASTOLIC BLOOD PRESSURE: 83 MMHG | RESPIRATION RATE: 18 BRPM | OXYGEN SATURATION: 100 %

## 2024-01-19 DIAGNOSIS — H61.22 IMPACTED CERUMEN OF LEFT EAR: ICD-10-CM

## 2024-01-19 DIAGNOSIS — S09.91XA INJURY OF EAR, INITIAL ENCOUNTER: Primary | ICD-10-CM

## 2024-01-19 DIAGNOSIS — T16.1XXA FOREIGN BODY OF RIGHT EAR, INITIAL ENCOUNTER: ICD-10-CM

## 2024-01-19 DIAGNOSIS — T14.8XXA HEMATOMA: ICD-10-CM

## 2024-01-19 PROCEDURE — 99213 OFFICE O/P EST LOW 20 MIN: CPT | Mod: S$GLB,,, | Performed by: NURSE PRACTITIONER

## 2024-01-19 RX ORDER — CIPROFLOXACIN AND DEXAMETHASONE 3; 1 MG/ML; MG/ML
SUSPENSION/ DROPS AURICULAR (OTIC)
Qty: 5 ML | Refills: 0 | Status: SHIPPED | OUTPATIENT
Start: 2024-01-19

## 2024-01-19 NOTE — PROGRESS NOTES
"Subjective:      Patient ID: Thuan Reynolds Jr. is a 43 y.o. male.    Vitals:  height is 5' 9" (1.753 m) and weight is 99.8 kg (220 lb). His temperature is 98.1 °F (36.7 °C). His blood pressure is 120/83 and his pulse is 70. His respiration is 18 and oxygen saturation is 100%.     Chief Complaint: Ear Fullness    C/o bilateral ear fullness, was cleaning his ears at home with a otoscope at home that allows you to see ear through phone.  He notes that he was digging in right ear when he noticed that the silicone tip was now missing.  Concern that part remains in right ear.  Denies pain.  States that he has muffled hearing.    Ear Fullness   There is pain in both ears. This is a new problem. The current episode started today. There has been no fever. The pain is at a severity of 0/10. The patient is experiencing no pain. Associated symptoms include hearing loss. Pertinent negatives include no abdominal pain, coughing, diarrhea, ear discharge, headaches, neck pain, rash, rhinorrhea, sore throat or vomiting. He has tried nothing for the symptoms.       Constitution: Negative for fever.   HENT:  Positive for foreign body in ear and hearing loss. Negative for ear pain, ear discharge and sore throat.    Neck: Negative for neck pain.   Respiratory:  Negative for cough.    Gastrointestinal:  Negative for abdominal pain, vomiting and diarrhea.   Skin:  Negative for rash.   Neurological:  Negative for headaches.      Objective:     Physical Exam   Constitutional: He is oriented to person, place, and time. He appears well-developed. He is cooperative.  Non-toxic appearance. He does not appear ill. No distress.   HENT:   Head: Normocephalic and atraumatic.   Ears:   Right Ear: Hearing and external ear normal. No tenderness. A foreign body is present. Tympanic membrane is erythematous. impacted cerumen  Left Ear: Hearing, external ear and ear canal normal. There is drainage and cerumen present. No swelling or tenderness. " Tympanic membrane is not erythematous. impacted cerumen  Nose: Nose normal. No mucosal edema, rhinorrhea or nasal deformity. No epistaxis. Right sinus exhibits no maxillary sinus tenderness and no frontal sinus tenderness. Left sinus exhibits no maxillary sinus tenderness and no frontal sinus tenderness.   Mouth/Throat: Uvula is midline, oropharynx is clear and moist and mucous membranes are normal. No trismus in the jaw. Normal dentition. No uvula swelling. No oropharyngeal exudate, posterior oropharyngeal edema or posterior oropharyngeal erythema.   Right: There is a hematoma to right ear.  Unable to fully visualize if there is a perforation  Left: There is excessing cerumen to left ear, unable to fully visualize the TM.  There is bloody drainage to the canal.        Comments: Right: There is a hematoma to right ear.  Unable to fully visualize if there is a perforation  Left: There is excessing cerumen to left ear, unable to fully visualize the TM.  There is bloody drainage to the canal.    Eyes: Conjunctivae and lids are normal. No scleral icterus.   Neck: Trachea normal and phonation normal. Neck supple. No edema present. No erythema present. No neck rigidity present.   Cardiovascular: Normal rate, regular rhythm, normal heart sounds and normal pulses.   Pulmonary/Chest: Effort normal and breath sounds normal. No respiratory distress. He has no decreased breath sounds. He has no rhonchi.   Abdominal: Normal appearance.   Musculoskeletal: Normal range of motion.         General: No deformity. Normal range of motion.   Neurological: He is alert and oriented to person, place, and time. He exhibits normal muscle tone. Coordination normal.   Skin: Skin is warm, dry, intact, not diaphoretic and not pale.   Psychiatric: His speech is normal and behavior is normal. Judgment and thought content normal.   Nursing note and vitals reviewed.      Assessment:     1. Injury of ear, initial encounter    2. Hematoma    3.  Impacted cerumen of left ear    4. Foreign body of right ear, initial encounter        Plan:     Referral placed to ENT for urgent f/u.   called to facilitate.    Placed on Ciprodex.  Injury of ear, initial encounter  -     Ambulatory referral/consult to ENT  -     ciprofloxacin-dexAMETHasone 0.3-0.1% (CIPRODEX) 0.3-0.1 % DrpS; Use 4 gtts in affected ear BID for 5-7 days  Dispense: 5 mL; Refill: 0    Hematoma  -     Ambulatory referral/consult to ENT  -     ciprofloxacin-dexAMETHasone 0.3-0.1% (CIPRODEX) 0.3-0.1 % DrpS; Use 4 gtts in affected ear BID for 5-7 days  Dispense: 5 mL; Refill: 0    Impacted cerumen of left ear  -     Ambulatory referral/consult to ENT    Foreign body of right ear, initial encounter  -     Ambulatory referral/consult to ENT  -     ciprofloxacin-dexAMETHasone 0.3-0.1% (CIPRODEX) 0.3-0.1 % DrpS; Use 4 gtts in affected ear BID for 5-7 days  Dispense: 5 mL; Refill: 0        Patient Instructions   DO NOT PLACE ANYTHING IN TO THE EAR  Follow up closely with ENT and your PCP  Okay to take Tylenol or Ibuprofen otc as needed for pain.  Go to the ER as needed for worsening symptoms.

## 2024-01-19 NOTE — PATIENT INSTRUCTIONS
DO NOT PLACE ANYTHING IN TO THE EAR  Follow up closely with ENT and your PCP  Okay to take Tylenol or Ibuprofen otc as needed for pain.  Go to the ER as needed for worsening symptoms.

## 2024-01-19 NOTE — PROCEDURES
Procedures  Procedure Note by QUINN PAK  Foreign Body Removal  Location: Right ear canal  Local Anesthesia: none  Wound Prep:  Foreign Body Removed with alligator forceps with no pain.  Drainage: bloody, scant    Patient tolerated Procedure well, urgent referral placed to ENT for follow up.

## 2024-01-22 ENCOUNTER — OFFICE VISIT (OUTPATIENT)
Dept: OTOLARYNGOLOGY | Facility: CLINIC | Age: 44
End: 2024-01-22
Payer: COMMERCIAL

## 2024-01-22 DIAGNOSIS — H61.23 IMPACTED CERUMEN, BILATERAL: ICD-10-CM

## 2024-01-22 DIAGNOSIS — H60.533 ACUTE CONTACT OTITIS EXTERNA OF BOTH EARS: Primary | ICD-10-CM

## 2024-01-22 PROCEDURE — 1159F MED LIST DOCD IN RCRD: CPT | Mod: CPTII,S$GLB,, | Performed by: OTOLARYNGOLOGY

## 2024-01-22 PROCEDURE — 99999 PR PBB SHADOW E&M-EST. PATIENT-LVL III: CPT | Mod: PBBFAC,,, | Performed by: OTOLARYNGOLOGY

## 2024-01-22 PROCEDURE — 69210 REMOVE IMPACTED EAR WAX UNI: CPT | Mod: S$GLB,,, | Performed by: OTOLARYNGOLOGY

## 2024-01-22 PROCEDURE — 99203 OFFICE O/P NEW LOW 30 MIN: CPT | Mod: 25,S$GLB,, | Performed by: OTOLARYNGOLOGY

## 2024-01-22 PROCEDURE — 1160F RVW MEDS BY RX/DR IN RCRD: CPT | Mod: CPTII,S$GLB,, | Performed by: OTOLARYNGOLOGY

## 2024-01-22 NOTE — PROCEDURES
"Ear Cerumen Removal    Date/Time: 1/22/2024 8:40 AM    Performed by: Rachid Sellers MD  Authorized by: Rachid Sellers MD    Time out: Immediately prior to procedure a "time out" was called to verify the correct patient, procedure, equipment, support staff and site/side marked as required.    Consent Done?:  Yes (Verbal)    Local anesthetic:  None  Location details:  Both ears  Cerumen  Removal Results:  Cerumen completely removed  Patient tolerance:  Patient tolerated the procedure well with no immediate complications     See note for details.      "

## 2024-01-22 NOTE — PROGRESS NOTES
"  [facility]    Subjective:      Patient: Thuan Reynolds Jr. Patient PCP: Rajendra Corea MD         :  1980     Sex:  male      MRN:  [id number]          Date of Visit: 2024      Chief Complaint: Cerumen Impaction (Patient stated,"I purchased one of those otoscopes that you can use to clean your ears." Patient reports that the silicone tip fell in his ear and he went to urgent care where they noticed bleeding in ear canal. //Patient also reported muffled hearing and tinnitus )      Patient ID: Thuan Reynolds Jr. is a 43 y.o. male     Patient is a  current smoker with a past medical history of Bell's palsy and prior subdermal hematoma but no chronic ear disease or immunosuppression.  No chronic dermatitis.  Seen today  referred to me by Milana Salgado in consultation for wax impactions and otitis.  Used a home otoscope and traumatized ears.  See an urgent care and treated with Ciprodex drops for prevention of infection.  No current pain or drainage.  Can not hear.  Symptoms really only of itching with wax impactions not chronically.  Does report a significant mass sweating of the ears.    Labs:  WBC   Date Value Ref Range Status   2023 7.31 3.90 - 12.70 K/uL Final     Hemoglobin   Date Value Ref Range Status   2023 15.1 14.0 - 18.0 g/dL Final     Platelets   Date Value Ref Range Status   2023 330 150 - 450 K/uL Final     Creatinine   Date Value Ref Range Status   2023 0.9 0.5 - 1.4 mg/dL Final     TSH   Date Value Ref Range Status   2023 1.478 0.400 - 4.000 uIU/mL Final     Hemoglobin A1C   Date Value Ref Range Status   2023 4.8 4.0 - 5.6 % Final     Comment:     ADA Screening Guidelines:  5.7-6.4%  Consistent with prediabetes  >or=6.5%  Consistent with diabetes    High levels of fetal hemoglobin interfere with the HbA1C  assay. Heterozygous hemoglobin variants (HbS, HgC, etc)do  not significantly interfere with this assay.   However, presence of " "multiple variants may affect accuracy.         Past Medical History  He has a past medical history of Bell's palsy, Otitis media, SDH (subdural hematoma), Seizure, Sertoli cell-only syndrome, and Skull vault fx.    Family / Surgical / Social History  His family history includes Alcohol abuse in his father and mother; Cancer in his maternal grandfather; Diabetes in his father; Early death in his father; Heart disease in his father; Heart failure in his father; Hyperlipidemia in his father; Hypertension in his father; Liver disease in his father and paternal uncle; Macular degeneration in his mother; Vision loss in his mother.    Past Surgical History:   Procedure Laterality Date    VARIOCOCELE REPAIR  2008    left side       Social History     Tobacco Use    Smoking status: Every Day     Current packs/day: 0.00     Types: Cigarettes    Smokeless tobacco: Never    Tobacco comments:     1 pack a week   Substance and Sexual Activity    Alcohol use: Yes     Alcohol/week: 5.0 standard drinks of alcohol     Types: 5 Cans of beer per week     Comment: once a week; 2-3 beers    Drug use: No    Sexual activity: Yes     Partners: Female     Birth control/protection: None     Comment:        Medications  He has a current medication list which includes the following prescription(s): ciprofloxacin-dexamethasone 0.3-0.1%, multivitamin, and varenicline.      Allergies  Review of patient's allergies indicates:  No Known Allergies    All medications, allergies, and past history have been reviewed.    Objective:      Vitals:      10/16/2023     8:03 AM 1/19/2024     5:38 PM 1/22/2024     8:27 AM   Vitals - 1 value per visit   SYSTOLIC 130 120    DIASTOLIC 90 83    Pulse 80 70    Temp  98.1 °F (36.7 °C)    Resp  18    SPO2  100 %    Weight (lb) 229.06 220    Weight (kg) 103.9 99.791    Height 5' 9" (1.753 m) 5' 9" (1.753 m)    BMI (Calculated) 33.8 32.5    Pain Score Zero  Zero       There is no height or weight on file to " calculate BSA.    Physical Exam:    GENERAL  APPEARANCE -  alert, appears stated age, and cooperative  BARRIER(S) TO COMMUNICATION -  none VOICE - appropriate for age and gender    INTEGUMENTARY  no suspicious head and neck lesions    HEENT  HEAD: Normocephalic, without obvious abnormality, atraumatic  FACE: INSPECTION - Symmetric, no signs of trauma, no suspicious lesion(s)      STRENGTH - grossly symmetric    EYES  Normal occular alignment and mobility with no visible nystagmus at rest    EARS/NOSE/MOUTH/THROAT  EARS  PINNAE AND EXTERNAL EARS - no suspicious lesion OTOSCOPIC EXAM (surgical microscopy was used for visualization/instrumentation): EAR EXAM - excessive wax preventing exam cleared with micro and suction only without trauma to reveal some mild excoriation of he medial anterior canals bilaterally without exposed bone or granulation and otherwise normal EAC, TM and ME exam bilaterally.    HEARING - dramatically improve/restored after disimpaction    NOSE AND SINUSES  EXTERNAL NOSE - Grossly normal for age/sex      CHEST AND LUNG   INSPECTION & AUSCULTATION - normal effort, no stridor    NEUROLOGIC  MENTAL STATUS - alert, interactive CRANIAL NERVES - normal      Procedure(s):  Cerumen removal performed.  See procedure note.          Assessment:      Problem List Items Addressed This Visit    None  Visit Diagnoses       Acute contact otitis externa of both ears    -  Primary    Impacted cerumen, bilateral                     Plan:      Wax suctioned clear Bilaterally.  Some mild inflammation of the ear canals but no need to continue the drops.  Routine ear care as outlined.  Prevention is the conklin.  We discussed derm otic oil but elected not to proceed with it at this time given the lack of any chronic itching.  Patient to return with any ear symptoms of recurrent wax impactions.  May consider using a liquid antiperspirant on a Q-tip just at the opening of the ear not down in the ear canal every week this  may help reduce sweating of the ears and wax buildup.    Follow up as needed          Voice recognition software was used in the creation of this note/communication and any sound-alike errors which may have occurred from its use should be taken in context when interpreting.  If such errors prevent a clear understanding of the note/communication, please contact the office for clarification.

## 2024-01-22 NOTE — PATIENT INSTRUCTIONS
Wax suctioned clear Bilaterally.  Some mild inflammation of the ear canals but no need to continue the drops.  Routine ear care as outlined.  Prevention is the conklin.  We discussed derm otic oil but elected not to proceed with it at this time given the lack of any chronic itching.  Patient to return with any ear symptoms of recurrent wax impactions.  May consider using a liquid antiperspirant on a Q-tip just at the opening of the ear not down in the ear canal every week this may help reduce sweating of the ears and wax buildup.    Follow up as needed          Voice recognition software was used in the creation of this note/communication and any sound-alike errors which may have occurred from its use should be taken in context when interpreting.  If such errors prevent a clear understanding of the note/communication, please contact the office for clarification.        DERMOTIC/FLUOCINOLONE OIL    Use prescribed fluocinolone/derm otic oil to both ears smearing around the outer ear scaling areas as well as down in the ear canal twice daily for a week no more than 2. At this point follow a routine ear care as outlined.  If not improved with this dose of steroid a higher concentration steroid may prove necessary.      ROUTINE EAR CARE    Keep the ears dry in general.  Water and soap dry the ears increases scaling by stripping away the natural oils of the ears.    A twisted single ply of facial tissue can be used to wick out moisture on the rare occasion when water gets stuck in the ear; or the water can be displaced with concentrated alcohol like OTC SwimEar or a few drops of 72-95% isopropyl alcohol to fill the ear canal.  Regular use will cause excess drying of the ears.    The ear should be kept moist in general with mineral oil. Three to four drops into the ear canal 2 to 3 times a week or even every night.    If the ears need to be irrigated use either a 50 50 mixture of white vinegar and distilled water or 50 50  mixture of alcohol and white vinegar.    Painful draining ears that do not resolve with conservative care could represent infection and may need microscopic office debridement/clearing of the wax, and topical antibiotic drops with or without steroids may need to be prescribed.     Resident

## 2024-01-23 ENCOUNTER — CLINICAL SUPPORT (OUTPATIENT)
Dept: OTHER | Facility: CLINIC | Age: 44
End: 2024-01-23
Payer: COMMERCIAL

## 2024-01-23 DIAGNOSIS — Z00.8 ENCOUNTER FOR OTHER GENERAL EXAMINATION: ICD-10-CM

## 2024-01-25 VITALS
SYSTOLIC BLOOD PRESSURE: 154 MMHG | DIASTOLIC BLOOD PRESSURE: 92 MMHG | HEIGHT: 69 IN | BODY MASS INDEX: 32.14 KG/M2 | WEIGHT: 217 LBS

## 2024-01-25 LAB
GLUCOSE SERPL-MCNC: 84 MG/DL (ref 60–140)
HDLC SERPL-MCNC: 50 MG/DL
POC CHOLESTEROL, LDL (DOCK): 99 MG/DL
POC CHOLESTEROL, TOTAL: 164 MG/DL
TRIGL SERPL-MCNC: 78 MG/DL

## 2024-02-26 ENCOUNTER — OFFICE VISIT (OUTPATIENT)
Dept: CARDIOLOGY | Facility: CLINIC | Age: 44
End: 2024-02-26
Payer: COMMERCIAL

## 2024-02-26 VITALS
BODY MASS INDEX: 33.18 KG/M2 | HEART RATE: 79 BPM | HEIGHT: 69 IN | SYSTOLIC BLOOD PRESSURE: 150 MMHG | DIASTOLIC BLOOD PRESSURE: 92 MMHG | WEIGHT: 224 LBS

## 2024-02-26 DIAGNOSIS — I83.12 VARICOSE VEINS OF BOTH LOWER EXTREMITIES WITH INFLAMMATION: Primary | ICD-10-CM

## 2024-02-26 DIAGNOSIS — I89.0 LYMPHEDEMA OF BOTH LOWER EXTREMITIES: ICD-10-CM

## 2024-02-26 DIAGNOSIS — I83.11 VARICOSE VEINS OF BOTH LOWER EXTREMITIES WITH INFLAMMATION: Primary | ICD-10-CM

## 2024-02-26 PROCEDURE — 99999 PR PBB SHADOW E&M-EST. PATIENT-LVL III: CPT | Mod: PBBFAC,,, | Performed by: INTERNAL MEDICINE

## 2024-02-26 PROCEDURE — 3077F SYST BP >= 140 MM HG: CPT | Mod: CPTII,S$GLB,, | Performed by: INTERNAL MEDICINE

## 2024-02-26 PROCEDURE — 3008F BODY MASS INDEX DOCD: CPT | Mod: CPTII,S$GLB,, | Performed by: INTERNAL MEDICINE

## 2024-02-26 PROCEDURE — 99215 OFFICE O/P EST HI 40 MIN: CPT | Mod: S$GLB,,, | Performed by: INTERNAL MEDICINE

## 2024-02-26 PROCEDURE — 3080F DIAST BP >= 90 MM HG: CPT | Mod: CPTII,S$GLB,, | Performed by: INTERNAL MEDICINE

## 2024-02-26 PROCEDURE — 1159F MED LIST DOCD IN RCRD: CPT | Mod: CPTII,S$GLB,, | Performed by: INTERNAL MEDICINE

## 2024-02-26 NOTE — PATIENT INSTRUCTIONS
Assessment/Plan:  Thuan Reynolds Jr. is a 43 y.o. male with venous insufficiency, Sertoli cell-only syndrome, obesity, who presents for a follow up appointment.    Varicose veins of lower extremity- Mr. Reynolds reports continued pain and discomfort at cluster of varicose veins at right calf area despite wearing compression hose and limiting sodium intake over the past 3 months.  He has no claudication symptoms or tissue loss.  ELLYN Study on 10/31/2023 revealed normal rest and exercise ABIs and waveforms are normal.  BLE Venous Reflux Study on 10/31/2023 demonstrated hemodynamically significant BLE venous reflux and no DVT. Continue wearing graduated compression hose.  Limit sodium intake to 2000 mg daily.  Limit volume intake to 1.5 L daily.  Elevate legs when resting.    2. Obesity- Encourage diet, exercise, and weight loss.     Follow up in 3 months

## 2024-02-26 NOTE — PROGRESS NOTES
[facility]      Chief Complaint   Patient presents with    Varicose Veins       Patient ID: Thuan Reynolds Jr. is a 43 y.o. male with venous insufficiency, Sertoli cell-only syndrome, obesity, who presents for a follow up appointment.  Pertinent history/events are as follows:     -Pt kindly referred by Dr. Corea for evaluation of varicose veins of the lower extremity.    -At our initial clinic visit on 10/16/2023, Mr. Reynolds reported a cluster of varicose veins at right calf area starting 2 years ago.  States the area itches at time.  He wears knee high compression hose at time.  No claudication or tissue loss.    Plan:   Varicose veins of lower extremity- Check BLE venous reflux study and ELLYN study.  If no severe PAD, start graduated compression hose.  Recommend wearing graduated compression hose.  Limit sodium intake to 2000 mg daily.  Limit volume intake to 1.5 L daily.  Elevate legs when resting.  Obesity- Encourage diet, exercise, and weight loss.     HPI:  Mr. Reynolds reports continued pain and discomfort at cluster of varicose veins at right calf area despite wearing compression hose and limiting sodium intake over the past 3 months.  He has no claudication symptoms or tissue loss.  ELLYN Study on 10/31/2023 revealed normal rest and exercise ABIs and waveforms are normal.  BLE Venous Reflux Study on 10/31/2023 demonstrated hemodynamically significant BLE venous reflux and no DVT.     Past Medical History:   Diagnosis Date    Bell's palsy     Otitis media     SDH (subdural hematoma)     BILAT SDH/SAH    Seizure     Sertoli cell-only syndrome     Skull vault fx      Past Surgical History:   Procedure Laterality Date    VARIOCOCELE REPAIR  2008    left side     Social History     Socioeconomic History    Marital status:    Tobacco Use    Smoking status: Every Day     Current packs/day: 0.00     Types: Cigarettes    Smokeless tobacco: Never    Tobacco comments:     1 pack a  week   Substance and Sexual Activity    Alcohol use: Yes     Alcohol/week: 5.0 standard drinks of alcohol     Types: 5 Cans of beer per week     Comment: once a week; 2-3 beers    Drug use: No    Sexual activity: Yes     Partners: Female     Birth control/protection: None     Comment:      Social Determinants of Health     Financial Resource Strain: Low Risk  (2/25/2024)    Overall Financial Resource Strain (CARDIA)     Difficulty of Paying Living Expenses: Not hard at all   Food Insecurity: No Food Insecurity (2/25/2024)    Hunger Vital Sign     Worried About Running Out of Food in the Last Year: Never true     Ran Out of Food in the Last Year: Never true   Transportation Needs: No Transportation Needs (2/25/2024)    PRAPARE - Transportation     Lack of Transportation (Medical): No     Lack of Transportation (Non-Medical): No   Physical Activity: Sufficiently Active (2/25/2024)    Exercise Vital Sign     Days of Exercise per Week: 5 days     Minutes of Exercise per Session: 50 min   Stress: No Stress Concern Present (2/25/2024)    [place] [employer] - Occupational Stress Questionnaire     Feeling of Stress : Only a little   Social Connections: Unknown (2/25/2024)    Social Connection and Isolation Panel [NHANES]     Frequency of Communication with Friends and Family: More than three times a week     Frequency of Social Gatherings with Friends and Family: Three times a week     Active Member of Clubs or Organizations: No     Attends Club or Organization Meetings: Patient declined     Marital Status:    Housing Stability: Low Risk  (2/25/2024)    Housing Stability Vital Sign     Unable to Pay for Housing in the Last Year: No     Number of Places Lived in the Last Year: 1     Unstable Housing in the Last Year: No     Family History   Problem Relation Age of Onset    Alcohol abuse Mother     Vision loss Mother     Macular degeneration Mother     Diabetes Father     Heart failure  "Father     Early death Father     Heart disease Father         MI in 40s and CABG    Hyperlipidemia Father     Hypertension Father     Liver disease Father     Alcohol abuse Father     Liver disease Paternal Uncle         primary sclerosing cholangitis    Cancer Maternal Grandfather         throat - smoker and drinker    Stroke Neg Hx        Review of patient's allergies indicates:  No Known Allergies    Medication List with Changes/Refills   Current Medications    CIPROFLOXACIN-DEXAMETHASONE 0.3-0.1% (CIPRODEX) 0.3-0.1 % DRPS    Use 4 gtts in affected ear BID for 5-7 days    MULTIVITAMIN (ONE DAILY MULTIVITAMIN) PER TABLET    Take 1 tablet by mouth once daily.    VARENICLINE (CHANTIX) 0.5 MG TAB    Take 1 tablet (0.5 mg total) by mouth 2 (two) times daily.       Review of Systems  Constitution: Denies chills, fever, and sweats.  HENT: Denies headaches or blurry vision.  Cardiovascular: Denies chest pain or irregular heart beat.  Respiratory: Denies cough or shortness of breath.  Gastrointestinal: Denies abdominal pain, nausea, or vomiting.  Musculoskeletal: Positive for varicose veins with itching at right leg.  Neurological: Denies dizziness or focal weakness.  Psychiatric/Behavioral: Normal mental status.  Hematologic/Lymphatic: Denies bleeding problem or easy bruising/bleeding.  Skin: Denies rash or suspicious lesions    Physical Examination  BP (!) 150/92   Pulse 79   Ht 5' 9" (1.753 m)   Wt 101.6 kg (223 lb 15.8 oz)   BMI 33.08 kg/m²     Constitutional: No acute distress, conversant  HEENT: Sclera anicteric, Pupils equal, round and reactive to light, extraocular motions intact, Oropharynx clear  Neck: No JVD, no carotid bruits  Cardiovascular: regular rate and rhythm, no murmur, rubs or gallops, normal S1/S2  Pulmonary: Clear to auscultation bilaterally  Abdominal: Abdomen soft, nontender, nondistended, positive bowel sounds  Extremities: RLE with cluster of varicose veins at calf  BLE's with no pitting " "edema and changes consistent with mild lymphedema  Pulses:  Carotid pulses are 2+ on the right side, and 2+ on the left side.  Radial pulses are 2+ on the right side, and 2+ on the left side.   Femoral pulses are 2+ on the right side, and 2+ on the left side.  Popliteal pulses are 2+ on the right side, and 2+ on the left side.   Dorsalis pedis pulses are 2+ on the right side, and 2+ on the left side.   Posterior tibial pulses are 2+ on the right side, and 2+ on the left side.    Skin: No ecchymosis, erythema, or ulcers  Psych: Alert and oriented x 3, appropriate affect  Neuro: CNII-XII intact, no focal deficits    Labs:  Most Recent Data  CBC:   Lab Results   Component Value Date    WBC 7.31 09/28/2023    HGB 15.1 09/28/2023    HCT 43.3 09/28/2023     09/28/2023    MCV 94 09/28/2023    RDW 13.1 09/28/2023     BMP:   Lab Results   Component Value Date     09/28/2023    K 4.6 09/28/2023     09/28/2023    CO2 28 09/28/2023    BUN 14 09/28/2023    CREATININE 0.9 09/28/2023    GLU 87 09/28/2023    CALCIUM 9.1 09/28/2023    MG 2.2 01/20/2018    PHOS 2.3 (L) 01/20/2018     LFTS;   Lab Results   Component Value Date    PROT 7.2 09/28/2023    ALBUMIN 4.1 09/28/2023    BILITOT 0.6 09/28/2023    AST 20 09/28/2023    ALKPHOS 63 09/28/2023    ALT 26 09/28/2023     COAGS:   Lab Results   Component Value Date    INR 1.0 01/17/2018     FLP:   Lab Results   Component Value Date    CHOL 186 09/28/2023    HDL 49 09/28/2023    LDLCALC 116.2 09/28/2023    TRIG 104 09/28/2023    CHOLHDL 26.3 09/28/2023     CARDIAC: No results found for: "TROPONINI", "CKTOTAL", "CKMB", "BNP"    Imaging:    ELLYN 10/31/2023:   Normal rest and exercise ABIs and waveforms are normal.     BLE Venous Reflux Study 10/31/2023:    There is no evidence of a right lower extremity DVT.    There is no evidence of a left lower extremity DVT.    The right common femoral vein has reflux.    The right greater saphenous vein has reflux.    The right " lesser saphenous vein has reflux.    The left common femoral vein has reflux.    The left greater saphenous vein has reflux.    Assessment/Plan:  Thuan Reynolds Jr. is a 43 y.o. male with venous insufficiency, Sertoli cell-only syndrome, obesity, who presents for a follow up appointment.    Varicose veins of lower extremity- Mr. Reynolds reports continued pain and discomfort at cluster of varicose veins at right calf area despite wearing compression hose and limiting sodium intake over the past 3 months.  He has no claudication symptoms or tissue loss.  ELLYN Study on 10/31/2023 revealed normal rest and exercise ABIs and waveforms are normal.  BLE Venous Reflux Study on 10/31/2023 demonstrated hemodynamically significant BLE venous reflux and no DVT. Continue wearing graduated compression hose.  Limit sodium intake to 2000 mg daily.  Limit volume intake to 1.5 L daily.  Elevate legs when resting.    2. Obesity- Encourage diet, exercise, and weight loss.     Follow up in 3 months     Total duration of face to face visit time 30 minutes.  Total time spent counseling greater than fifty percent of total visit time.  Counseling included discussion regarding imaging findings, diagnosis, possibilities, treatment options, risks and benefits.  The patient had many questions regarding the options and long-term effects.    Darryl Moncada MD, PhD  Interventional Cardiology

## 2024-04-26 ENCOUNTER — OFFICE VISIT (OUTPATIENT)
Dept: CARDIOLOGY | Facility: CLINIC | Age: 44
End: 2024-04-26
Payer: COMMERCIAL

## 2024-04-26 VITALS
SYSTOLIC BLOOD PRESSURE: 127 MMHG | OXYGEN SATURATION: 98 % | HEART RATE: 73 BPM | HEIGHT: 69 IN | WEIGHT: 222 LBS | BODY MASS INDEX: 32.88 KG/M2 | DIASTOLIC BLOOD PRESSURE: 81 MMHG

## 2024-04-26 DIAGNOSIS — I83.12 VARICOSE VEINS OF BOTH LOWER EXTREMITIES WITH INFLAMMATION: ICD-10-CM

## 2024-04-26 DIAGNOSIS — I83.11 VARICOSE VEINS OF BOTH LOWER EXTREMITIES WITH INFLAMMATION: ICD-10-CM

## 2024-04-26 DIAGNOSIS — I83.11 VARICOSE VEINS OF BOTH LOWER EXTREMITIES WITH INFLAMMATION: Primary | ICD-10-CM

## 2024-04-26 DIAGNOSIS — I83.12 VARICOSE VEINS OF BOTH LOWER EXTREMITIES WITH INFLAMMATION: Primary | ICD-10-CM

## 2024-04-26 PROCEDURE — 1160F RVW MEDS BY RX/DR IN RCRD: CPT | Mod: CPTII,S$GLB,, | Performed by: INTERNAL MEDICINE

## 2024-04-26 PROCEDURE — 1159F MED LIST DOCD IN RCRD: CPT | Mod: CPTII,S$GLB,, | Performed by: INTERNAL MEDICINE

## 2024-04-26 PROCEDURE — 3079F DIAST BP 80-89 MM HG: CPT | Mod: CPTII,S$GLB,, | Performed by: INTERNAL MEDICINE

## 2024-04-26 PROCEDURE — 3074F SYST BP LT 130 MM HG: CPT | Mod: CPTII,S$GLB,, | Performed by: INTERNAL MEDICINE

## 2024-04-26 PROCEDURE — 99999 PR PBB SHADOW E&M-EST. PATIENT-LVL IV: CPT | Mod: PBBFAC,,, | Performed by: INTERNAL MEDICINE

## 2024-04-26 PROCEDURE — 3008F BODY MASS INDEX DOCD: CPT | Mod: CPTII,S$GLB,, | Performed by: INTERNAL MEDICINE

## 2024-04-26 PROCEDURE — 99205 OFFICE O/P NEW HI 60 MIN: CPT | Mod: S$GLB,,, | Performed by: INTERNAL MEDICINE

## 2024-04-26 NOTE — PROGRESS NOTES
Subjective:    Patient ID:  Thuan Reynolds Jr. is a 44 y.o. male who presents for evaluation of Varicose veins     Referring physician: Dr. Darryl Moncada     Coronary Artery Disease      44-year-old male with past medical history of Seroti-cell only syndrome presents for evaluation of his  varicose veins in his right leg.  Patient has been followed by Dr. Moncada and he was managed conservatively but patient continues to have symptomatic varicose veins on his right calf muscle   from insufficiency of lesser saphenous vein.  He has been using compression stockings but still continues to have the discomfort and itching for which she presents to the clinic to discuss about ablation options.  Patient denies having any swelling in his legs, skin discoloration, ulcers.  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  Past Medical History:   Diagnosis Date    Bell's palsy     Otitis media     SDH (subdural hematoma)     BILAT SDH/SAH    Seizure     Sertoli cell-only syndrome     Skull vault fx        Past Surgical History:   Procedure Laterality Date    VARIOCOCELE REPAIR  2008    left side       Current Outpatient Medications on File Prior to Visit   Medication Sig Dispense Refill    multivitamin (ONE DAILY MULTIVITAMIN) per tablet Take 1 tablet by mouth once daily.      ciprofloxacin-dexAMETHasone 0.3-0.1% (CIPRODEX) 0.3-0.1 % DrpS Use 4 gtts in affected ear BID for 5-7 days (Patient not taking: Reported on 2/26/2024) 5 mL 0    varenicline (CHANTIX) 0.5 MG Tab Take 1 tablet (0.5 mg total) by mouth 2 (two) times daily. (Patient not taking: Reported on 10/16/2023) 9 tablet 0     No current facility-administered medications on file prior to visit.       Review of patient's allergies indicates:  No Known Allergies    Social History     Tobacco Use    Smoking status: Every Day     Current packs/day: 0.00     Types: Cigarettes    Smokeless tobacco: Never    Tobacco comments:     " 1 pack a week   Substance Use Topics    Alcohol use: Yes     Alcohol/week: 5.0 standard drinks of alcohol     Types: 5 Cans of beer per week     Comment: once a week; 2-3 beers    Drug use: No       Family History   Problem Relation Name Age of Onset    Alcohol abuse Mother Miracle Reynolds     Vision loss Mother Miracle Reynolds     Macular degeneration Mother Miracle Reynolds     Diabetes Father Thuan Reynolds     Heart failure Father Thuan Reynolds     Early death Father Thuan Reynolds     Heart disease Father Thuan Reynolds         MI in 40s and CABG    Hyperlipidemia Father Thuan Reynolds     Hypertension Father Thuan Reynolds     Liver disease Father Thuan Reynolds     Alcohol abuse Father Thuan Reynolds     Liver disease Paternal Uncle          primary sclerosing cholangitis    Cancer Maternal Grandfather Alec Bailonjr         throat - smoker and drinker    Stroke Neg Hx           Review of Systems   Constitutional: Negative.   HENT: Negative.     Eyes: Negative.    Cardiovascular: Negative.    Respiratory: Negative.     Endocrine: Negative.    Gastrointestinal: Negative.    Genitourinary: Negative.    Neurological: Negative.         Objective:     Vitals:    04/26/24 0808 04/26/24 0809   BP: (!) 140/85 127/81   BP Location: Left arm Right arm   Patient Position: Sitting Sitting   BP Method: Large (Automatic) Large (Automatic)   Pulse: 81 73   SpO2: 98% 98%   Weight: 100.7 kg (222 lb 0.1 oz)    Height: 5' 9" (1.753 m)         Physical Exam  Constitutional:       Appearance: Normal appearance.   HENT:      Head: Normocephalic and atraumatic.      Mouth/Throat:      Mouth: Mucous membranes are moist.   Cardiovascular:      Rate and Rhythm: Normal rate and regular rhythm.   Pulmonary:      Effort: Pulmonary effort is normal.      Breath sounds: Normal breath sounds.   Abdominal:      General: Abdomen is flat. Bowel sounds are normal.      Palpations: " Abdomen is soft.   Musculoskeletal:      Cervical back: Normal range of motion.   Skin:     General: Skin is warm.      Capillary Refill: Capillary refill takes less than 2 seconds.   Neurological:      General: No focal deficit present.      Mental Status: He is alert and oriented to person, place, and time.         Assessment:       1. Varicose veins of both lower extremities with inflammation         Plan:          Varicose veins of right lesser saphenous vein.  Venous ultrasound ruled out DVT and confirmed the positive reflex in right greater and lesser saphenous veins      Venous Clinical Severity Score  Pain:3=Daily, severe limiting activities or requiring regular use of analgesics  Varicose Veins: 2=Multiple. GS varicose veins confined to calf or thigh  Venous Edema: 3=Morning edema above ankle and requiring activity change, elevation  Pigmentation: 0=None or focal, low intensity (tan)  Inflammation: 0=None  Induration: 0=None  Number of Active Ulcers: 0=0  Active Ulceration, Duration: 0=None  Active Ulcer Size: 0=None  Compressive Therapy: 3=Full compliance, stockings + elevation  Total Score: 11        CEAP Classification  Clinical Signs: Class 3 - Edema  Etiologic Classification: Primary  Anatomic distribution: Superficial  Pathophysiologic dysfunction: Reflux     Plan       I have seen the patient, reviewed the Fellow's history and physical, assessment and plan. I have personally interviewed and examined the patient and agree with the findings.  Bilateral lesser and greater saphenous vein reflux with severe smx not responding to conservative therapy for over 1 yr. Additional vaocisities with reflux. Recommend GSV abaltion of the right leg.             QUINN Braga MD

## 2024-05-30 ENCOUNTER — OFFICE VISIT (OUTPATIENT)
Dept: CARDIOLOGY | Facility: CLINIC | Age: 44
End: 2024-05-30
Payer: COMMERCIAL

## 2024-05-30 VITALS
HEIGHT: 69 IN | SYSTOLIC BLOOD PRESSURE: 142 MMHG | WEIGHT: 215.63 LBS | BODY MASS INDEX: 31.94 KG/M2 | DIASTOLIC BLOOD PRESSURE: 91 MMHG | HEART RATE: 74 BPM

## 2024-05-30 DIAGNOSIS — I83.12 VARICOSE VEINS OF BOTH LOWER EXTREMITIES WITH INFLAMMATION: ICD-10-CM

## 2024-05-30 DIAGNOSIS — I83.11 VARICOSE VEINS OF BOTH LOWER EXTREMITIES WITH INFLAMMATION: ICD-10-CM

## 2024-05-30 DIAGNOSIS — I89.0 LYMPHEDEMA OF BOTH LOWER EXTREMITIES: Primary | ICD-10-CM

## 2024-05-30 DIAGNOSIS — I87.2 VENOUS INSUFFICIENCY OF BOTH LOWER EXTREMITIES: ICD-10-CM

## 2024-05-30 PROCEDURE — 99999 PR PBB SHADOW E&M-EST. PATIENT-LVL III: CPT | Mod: PBBFAC,,, | Performed by: INTERNAL MEDICINE

## 2024-05-30 PROCEDURE — 1159F MED LIST DOCD IN RCRD: CPT | Mod: CPTII,S$GLB,, | Performed by: INTERNAL MEDICINE

## 2024-05-30 PROCEDURE — 99215 OFFICE O/P EST HI 40 MIN: CPT | Mod: S$GLB,,, | Performed by: INTERNAL MEDICINE

## 2024-05-30 PROCEDURE — 3080F DIAST BP >= 90 MM HG: CPT | Mod: CPTII,S$GLB,, | Performed by: INTERNAL MEDICINE

## 2024-05-30 PROCEDURE — 3077F SYST BP >= 140 MM HG: CPT | Mod: CPTII,S$GLB,, | Performed by: INTERNAL MEDICINE

## 2024-05-30 PROCEDURE — 3008F BODY MASS INDEX DOCD: CPT | Mod: CPTII,S$GLB,, | Performed by: INTERNAL MEDICINE

## 2024-05-30 NOTE — PATIENT INSTRUCTIONS
Assessment/Plan:  Thuan Reynolds Jr. is a 44 y.o. male with venous insufficiency, Sertoli cell-only syndrome, obesity, who presents for a follow up appointment.    Varicose veins of lower extremity- Mr. Reynolds reports continued pain and discomfort at cluster of varicose veins at right calf area despite wearing compression hose and limiting sodium intake for several months.  He was evaluated by Dr. Braga and will be scheduled for right GSV ablation.  Continue wearing graduated compression hose.  Limit sodium intake to 2000 mg daily.  Limit volume intake to 1.5 L daily.  Elevate legs when resting.    2. Obesity- Encourage diet, exercise, and weight loss.     Follow up in 4 months

## 2024-05-30 NOTE — PROGRESS NOTES
[facility]      Chief Complaint   Patient presents with    Varicose veins of both lower extremities with inflammation       Patient ID: Thuan Reynolds Jr. is a 44 y.o. male with venous insufficiency, Sertoli cell-only syndrome, obesity, who presents for a follow up appointment.  Pertinent history/events are as follows:     -Pt kindly referred by Dr. Corea for evaluation of varicose veins of the lower extremity.    -At our initial clinic visit on 10/16/2023, Mr. Reynolds reported a cluster of varicose veins at right calf area starting 2 years ago.  States the area itches at time.  He wears knee high compression hose at time.  No claudication or tissue loss.    Plan:   Varicose veins of lower extremity- Check BLE venous reflux study and ELLYN study.  If no severe PAD, start graduated compression hose.  Recommend wearing graduated compression hose.  Limit sodium intake to 2000 mg daily.  Limit volume intake to 1.5 L daily.  Elevate legs when resting.  Obesity- Encourage diet, exercise, and weight loss.     -At out initial clinic visit on 2/26/2024, Mr. Reynolds reports continued pain and discomfort at cluster of varicose veins at right calf area despite wearing compression hose and limiting sodium intake over the past 3 months.  He has no claudication symptoms or tissue loss.  ELLYN Study on 10/31/2023 revealed normal rest and exercise ABIs and waveforms are normal.  BLE Venous Reflux Study on 10/31/2023 demonstrated hemodynamically significant BLE venous reflux and no DVT.   Plan:   Varicose veins of lower extremity- Mr. Reynolds reports continued pain and discomfort at cluster of varicose veins at right calf area despite wearing compression hose and limiting sodium intake over the past 3 months.  He has no claudication symptoms or tissue loss.  ELLYN Study on 10/31/2023 revealed normal rest and exercise ABIs and waveforms are normal.  BLE Venous Reflux Study on 10/31/2023 demonstrated  hemodynamically significant BLE venous reflux and no DVT. Continue wearing graduated compression hose.  Limit sodium intake to 2000 mg daily.  Limit volume intake to 1.5 L daily.  Elevate legs when resting.  Obesity- Encourage diet, exercise, and weight loss.     HPI:  Mr. Reynolds reports continued pain and discomfort at cluster of varicose veins at right calf area despite wearing compression hose and limiting sodium intake for several months.  He was evaluated by Dr. Braga and will be scheduled for right GSV ablation.      Past Medical History:   Diagnosis Date    Bell's palsy     Otitis media     SDH (subdural hematoma)     BILAT SDH/SAH    Seizure     Sertoli cell-only syndrome     Skull vault fx      Past Surgical History:   Procedure Laterality Date    VARIOCOCELE REPAIR  2008    left side     Social History     Socioeconomic History    Marital status:    Tobacco Use    Smoking status: Every Day     Current packs/day: 0.00     Types: Cigarettes    Smokeless tobacco: Never    Tobacco comments:     1 pack a week   Substance and Sexual Activity    Alcohol use: Yes     Alcohol/week: 5.0 standard drinks of alcohol     Types: 5 Cans of beer per week     Comment: once a week; 2-3 beers    Drug use: No    Sexual activity: Yes     Partners: Female     Birth control/protection: None     Comment:      Social Determinants of Health     Financial Resource Strain: Low Risk  (2/25/2024)    Overall Financial Resource Strain (CARDIA)     Difficulty of Paying Living Expenses: Not hard at all   Food Insecurity: No Food Insecurity (2/25/2024)    Hunger Vital Sign     Worried About Running Out of Food in the Last Year: Never true     Ran Out of Food in the Last Year: Never true   Transportation Needs: No Transportation Needs (2/25/2024)    PRAPARE - Transportation     Lack of Transportation (Medical): No     Lack of Transportation (Non-Medical): No   Physical Activity: Sufficiently Active (2/25/2024)     Exercise Vital Sign     Days of Exercise per Week: 5 days     Minutes of Exercise per Session: 50 min   Stress: No Stress Concern Present (2/25/2024)    [employer] [place] [employer] - Occupational Stress Questionnaire     Feeling of Stress : Only a little   Housing Stability: Low Risk  (2/25/2024)    Housing Stability Vital Sign     Unable to Pay for Housing in the Last Year: No     Number of Places Lived in the Last Year: 1     Unstable Housing in the Last Year: No     Family History   Problem Relation Name Age of Onset    Alcohol abuse Mother Miracle Reynolds     Vision loss Mother Miracle Reynolds     Macular degeneration Mother Miracle Reynolds     Diabetes Father Thuan Reynolds     Heart failure Father Thuan Reynolds     Early death Father Thuan Reynolds     Heart disease Father Thuan Reynolds         MI in 40s and CABG    Hyperlipidemia Father Thuan Reynolds     Hypertension Father Thuan Reynolds     Liver disease Father Thuan Reynolds     Alcohol abuse Father Thuan Reynolds     Liver disease Paternal Uncle          primary sclerosing cholangitis    Cancer Maternal Grandfather Alec Corcoranjr katherine         throat - smoker and drinker    Stroke Neg Hx         Review of patient's allergies indicates:  No Known Allergies    Medication List with Changes/Refills   Current Medications    CIPROFLOXACIN-DEXAMETHASONE 0.3-0.1% (CIPRODEX) 0.3-0.1 % DRPS    Use 4 gtts in affected ear BID for 5-7 days    MULTIVITAMIN (ONE DAILY MULTIVITAMIN) PER TABLET    Take 1 tablet by mouth once daily.    VARENICLINE (CHANTIX) 0.5 MG TAB    Take 1 tablet (0.5 mg total) by mouth 2 (two) times daily.       Review of Systems  Constitution: Denies chills, fever, and sweats.  HENT: Denies headaches or blurry vision.  Cardiovascular: Denies chest pain or irregular heart beat.  Respiratory: Denies cough or shortness of breath.  Gastrointestinal: Denies abdominal pain, nausea, or  vomiting.  Musculoskeletal: Positive for varicose veins with itching at right leg.  Neurological: Denies dizziness or focal weakness.  Psychiatric/Behavioral: Normal mental status.  Hematologic/Lymphatic: Denies bleeding problem or easy bruising/bleeding.  Skin: Denies rash or suspicious lesions    Physical Examination  There were no vitals taken for this visit.    Constitutional: No acute distress, conversant  HEENT: Sclera anicteric, Pupils equal, round and reactive to light, extraocular motions intact, Oropharynx clear  Neck: No JVD, no carotid bruits  Cardiovascular: regular rate and rhythm, no murmur, rubs or gallops, normal S1/S2  Pulmonary: Clear to auscultation bilaterally  Abdominal: Abdomen soft, nontender, nondistended, positive bowel sounds  Extremities: RLE with cluster of varicose veins at calf  BLE's with no pitting edema and changes consistent with mild lymphedema  Pulses:  Carotid pulses are 2+ on the right side, and 2+ on the left side.  Radial pulses are 2+ on the right side, and 2+ on the left side.   Femoral pulses are 2+ on the right side, and 2+ on the left side.  Popliteal pulses are 2+ on the right side, and 2+ on the left side.   Dorsalis pedis pulses are 2+ on the right side, and 2+ on the left side.   Posterior tibial pulses are 2+ on the right side, and 2+ on the left side.    Skin: No ecchymosis, erythema, or ulcers  Psych: Alert and oriented x 3, appropriate affect  Neuro: CNII-XII intact, no focal deficits    Labs:  Most Recent Data  CBC:   Lab Results   Component Value Date    WBC 7.31 09/28/2023    HGB 15.1 09/28/2023    HCT 43.3 09/28/2023     09/28/2023    MCV 94 09/28/2023    RDW 13.1 09/28/2023     BMP:   Lab Results   Component Value Date     09/28/2023    K 4.6 09/28/2023     09/28/2023    CO2 28 09/28/2023    BUN 14 09/28/2023    CREATININE 0.9 09/28/2023    GLU 87 09/28/2023    CALCIUM 9.1 09/28/2023    MG 2.2 01/20/2018    PHOS 2.3 (L) 01/20/2018  "    LFTS;   Lab Results   Component Value Date    PROT 7.2 09/28/2023    ALBUMIN 4.1 09/28/2023    BILITOT 0.6 09/28/2023    AST 20 09/28/2023    ALKPHOS 63 09/28/2023    ALT 26 09/28/2023     COAGS:   Lab Results   Component Value Date    INR 1.0 01/17/2018     FLP:   Lab Results   Component Value Date    CHOL 186 09/28/2023    HDL 49 09/28/2023    LDLCALC 116.2 09/28/2023    TRIG 104 09/28/2023    CHOLHDL 26.3 09/28/2023     CARDIAC: No results found for: "TROPONINI", "CKTOTAL", "CKMB", "BNP"    Imaging:    ELLYN 10/31/2023:   Normal rest and exercise ABIs and waveforms are normal.     BLE Venous Reflux Study 10/31/2023:    There is no evidence of a right lower extremity DVT.    There is no evidence of a left lower extremity DVT.    The right common femoral vein has reflux.    The right greater saphenous vein has reflux.    The right lesser saphenous vein has reflux.    The left common femoral vein has reflux.    The left greater saphenous vein has reflux.    Assessment/Plan:  Thuan Reynolds Jr. is a 44 y.o. male with venous insufficiency, Sertoli cell-only syndrome, obesity, who presents for a follow up appointment.    Varicose veins of lower extremity- Mr. Reynolds reports continued pain and discomfort at cluster of varicose veins at right calf area despite wearing compression hose and limiting sodium intake for several months.  He was evaluated by Dr. Braga and will be scheduled for right GSV ablation.  Continue wearing graduated compression hose.  Limit sodium intake to 2000 mg daily.  Limit volume intake to 1.5 L daily.  Elevate legs when resting.    2. Obesity- Encourage diet, exercise, and weight loss.     Follow up in 4 months     Total duration of face to face visit time 30 minutes.  Total time spent counseling greater than fifty percent of total visit time.  Counseling included discussion regarding imaging findings, diagnosis, possibilities, treatment options, risks and benefits.  The patient " had many questions regarding the options and long-term effects.    Darryl Moncada MD, PhD  Interventional Cardiology

## 2024-08-23 ENCOUNTER — PATIENT MESSAGE (OUTPATIENT)
Dept: CARDIOLOGY | Facility: CLINIC | Age: 44
End: 2024-08-23
Payer: COMMERCIAL

## 2024-09-04 ENCOUNTER — DOCUMENTATION ONLY (OUTPATIENT)
Dept: CARDIOLOGY | Facility: CLINIC | Age: 44
End: 2024-09-04
Payer: COMMERCIAL

## 2024-09-04 ENCOUNTER — PATIENT MESSAGE (OUTPATIENT)
Dept: CARDIOLOGY | Facility: CLINIC | Age: 44
End: 2024-09-04
Payer: COMMERCIAL

## 2024-09-04 NOTE — PROGRESS NOTES
EVLT/Sclerotherapy Scheduled October 17, 2024 @ 08:00 AM  [facility]   [address]   [place]. [id number]   3rd Floor           Preprocedure:       Please eat prior to arrival. Procedure does not require you to fast.       Please arrive 30 minutes prior to your scheduled appointment time to the 3rd floor in the clinic tower.       Take valium 10 mg by mouth 1 hour prior to the scheduled procedure time.  Make sure you have someone to drive you to and from the procedure.       Please bring your stockings with you so you can wear them home.               Post procedure:            No tub baths or soaking in water for 1 week.  You may take showers only.       2.  You must wear compression wrap/hose around the clock for 72 hours.  When showering during this time, compression wrap/hose must remain on.  You may change your wet compression wrap/hose while you are lying flat and someone is available to assist you.       3.  After 72 hours, compression wrap/hose must be worn during the day for 30 days.       4. If not allergic, Ibuprofen/Tylenol may be taken as needed for any pain or discomfort.       5.  A follow up appointment for an ultrasound and an office visit with your Doctor will be scheduled for you in 30 days.       6.  If you have any questions or concerns, please call the office at [phone] and ask for Dr Omi garcia.

## 2024-09-17 ENCOUNTER — OFFICE VISIT (OUTPATIENT)
Dept: CARDIOLOGY | Facility: CLINIC | Age: 44
End: 2024-09-17
Payer: COMMERCIAL

## 2024-09-17 VITALS — WEIGHT: 210 LBS | BODY MASS INDEX: 31.1 KG/M2 | HEIGHT: 69 IN | HEART RATE: 78 BPM

## 2024-09-17 DIAGNOSIS — I89.0 LYMPHEDEMA OF BOTH LOWER EXTREMITIES: ICD-10-CM

## 2024-09-17 DIAGNOSIS — I83.12 VARICOSE VEINS OF BOTH LOWER EXTREMITIES WITH INFLAMMATION: Primary | ICD-10-CM

## 2024-09-17 DIAGNOSIS — I83.11 VARICOSE VEINS OF BOTH LOWER EXTREMITIES WITH INFLAMMATION: Primary | ICD-10-CM

## 2024-09-17 PROCEDURE — 3008F BODY MASS INDEX DOCD: CPT | Mod: CPTII,95,, | Performed by: INTERNAL MEDICINE

## 2024-09-17 PROCEDURE — 1159F MED LIST DOCD IN RCRD: CPT | Mod: CPTII,95,, | Performed by: INTERNAL MEDICINE

## 2024-09-17 PROCEDURE — 99214 OFFICE O/P EST MOD 30 MIN: CPT | Mod: 95,,, | Performed by: INTERNAL MEDICINE

## 2024-09-17 NOTE — PATIENT INSTRUCTIONS
Assessment/Plan:  Thuan Reynolds Jr. is a 44 y.o. male with venous insufficiency, Sertoli cell-only syndrome, obesity, who presents for a follow up appointment.    Varicose veins of lower extremity- Mr. Reynolds reports continued pain and discomfort at cluster of varicose veins at right calf area despite wearing compression hose and limiting sodium intake for several months.  He was evaluated by Dr. Braga and is scheduled for to undergo right GSV ablation with VenaSeal on 10/17/2024.  Continue wearing graduated compression hose.  Limit sodium intake to 2000 mg daily.  Limit volume intake to 1.5 L daily.  Elevate legs when resting.    2. Obesity- Encourage diet, exercise, and weight loss.     Follow up in 5 months

## 2024-09-17 NOTE — PROGRESS NOTES
[facility]      Chief Complaint   Patient presents with    Lymphedema of both lower extremities       Patient ID: Thuan Reynolds Jr. is a 44 y.o. male with venous insufficiency, Sertoli cell-only syndrome, obesity, who presents for a follow up appointment.  Pertinent history/events are as follows:     -Pt kindly referred by Dr. Corea for evaluation of varicose veins of the lower extremity.    -At our initial clinic visit on 10/16/2023, Mr. Reynolds reported a cluster of varicose veins at right calf area starting 2 years ago.  States the area itches at time.  He wears knee high compression hose at time.  No claudication or tissue loss.    Plan:   Varicose veins of lower extremity- Check BLE venous reflux study and ELLYN study.  If no severe PAD, start graduated compression hose.  Recommend wearing graduated compression hose.  Limit sodium intake to 2000 mg daily.  Limit volume intake to 1.5 L daily.  Elevate legs when resting.  Obesity- Encourage diet, exercise, and weight loss.     -At out initial clinic visit on 2/26/2024, Mr. Reynolds reports continued pain and discomfort at cluster of varicose veins at right calf area despite wearing compression hose and limiting sodium intake over the past 3 months.  He has no claudication symptoms or tissue loss.  ELLYN Study on 10/31/2023 revealed normal rest and exercise ABIs and waveforms are normal.  BLE Venous Reflux Study on 10/31/2023 demonstrated hemodynamically significant BLE venous reflux and no DVT.   Plan:   Varicose veins of lower extremity- Mr. Reynolds reports continued pain and discomfort at cluster of varicose veins at right calf area despite wearing compression hose and limiting sodium intake over the past 3 months.  He has no claudication symptoms or tissue loss.  ELLYN Study on 10/31/2023 revealed normal rest and exercise ABIs and waveforms are normal.  BLE Venous Reflux Study on 10/31/2023 demonstrated hemodynamically significant  BLE venous reflux and no DVT. Continue wearing graduated compression hose.  Limit sodium intake to 2000 mg daily.  Limit volume intake to 1.5 L daily.  Elevate legs when resting.  Obesity- Encourage diet, exercise, and weight loss.     -At follow up clinic visit on 5/30/2024, Mr. Reynolds reports continued pain and discomfort at cluster of varicose veins at right calf area despite wearing compression hose and limiting sodium intake for several months.  He was evaluated by Dr. Braga and will be scheduled for right GSV ablation.   Plan:  Varicose veins of lower extremity- Mr. Reynolds reports continued pain and discomfort at cluster of varicose veins at right calf area despite wearing compression hose and limiting sodium intake for several months.  He was evaluated by Dr. Braga and will be scheduled for right GSV ablation.  Continue wearing graduated compression hose.  Limit sodium intake to 2000 mg daily.  Limit volume intake to 1.5 L daily.  Elevate legs when resting.  Obesity- Encourage diet, exercise, and weight loss.     HPI:  Mr. Reynolds reports continued pain and discomfort at cluster of varicose veins at right calf area despite wearing compression hose and limiting sodium intake for several months.  He was evaluated by Dr. Braga and is scheduled for to undergo right GSV ablation with VenaSeal on 10/17/2024.      Past Medical History:   Diagnosis Date    Bell's palsy     Otitis media     SDH (subdural hematoma)     BILAT SDH/SAH    Seizure     Sertoli cell-only syndrome     Skull vault fx      Past Surgical History:   Procedure Laterality Date    VARIOCOCELE REPAIR  2008    left side     Social History     Socioeconomic History    Marital status:    Tobacco Use    Smoking status: Every Day     Current packs/day: 0.00     Types: Cigarettes    Smokeless tobacco: Never    Tobacco comments:     1 pack a week   Substance and Sexual Activity    Alcohol use: Yes     Alcohol/week: 5.0 standard  drinks of alcohol     Types: 5 Cans of beer per week     Comment: once a week; 2-3 beers    Drug use: No    Sexual activity: Yes     Partners: Female     Birth control/protection: None     Comment:      Social Determinants of Health     Financial Resource Strain: Low Risk  (2/25/2024)    Overall Financial Resource Strain (CARDIA)     Difficulty of Paying Living Expenses: Not hard at all   Food Insecurity: No Food Insecurity (2/25/2024)    Hunger Vital Sign     Worried About Running Out of Food in the Last Year: Never true     Ran Out of Food in the Last Year: Never true   Transportation Needs: No Transportation Needs (2/25/2024)    PRAPARE - Transportation     Lack of Transportation (Medical): No     Lack of Transportation (Non-Medical): No   Physical Activity: Sufficiently Active (2/25/2024)    Exercise Vital Sign     Days of Exercise per Week: 5 days     Minutes of Exercise per Session: 50 min   Stress: No Stress Concern Present (2/25/2024)    [employer] [place] [employer] - Occupational Stress Questionnaire     Feeling of Stress : Only a little   Housing Stability: Low Risk  (2/25/2024)    Housing Stability Vital Sign     Unable to Pay for Housing in the Last Year: No     Number of Places Lived in the Last Year: 1     Unstable Housing in the Last Year: No     Family History   Problem Relation Name Age of Onset    Alcohol abuse Mother Miracle Reynolds     Vision loss Mother Miracle Reynolds     Macular degeneration Mother Miracle Reynolds     Diabetes Father Thuan Howeaggie     Heart failure Father Thuan Gail     Early death Father Thuan Gail     Heart disease Father Thuan Gail         MI in 40s and CABG    Hyperlipidemia Father Thuan Gail     Hypertension Father Thuan Gail     Liver disease Father Thuan Gial     Alcohol abuse Father Thuan Gail     Liver disease Paternal Uncle          primary sclerosing  "cholangitis    Cancer Maternal Grandfather Alec Bailon jr         throat - smoker and drinker    Stroke Neg Hx         Review of patient's allergies indicates:  No Known Allergies    Medication List with Changes/Refills   Current Medications    CIPROFLOXACIN-DEXAMETHASONE 0.3-0.1% (CIPRODEX) 0.3-0.1 % DRPS    Use 4 gtts in affected ear BID for 5-7 days    MULTIVITAMIN (ONE DAILY MULTIVITAMIN) PER TABLET    Take 1 tablet by mouth once daily.   Discontinued Medications    VARENICLINE (CHANTIX) 0.5 MG TAB    Take 1 tablet (0.5 mg total) by mouth 2 (two) times daily.       Review of Systems  Constitution: Denies chills, fever, and sweats.  HENT: Denies headaches or blurry vision.  Cardiovascular: Denies chest pain or irregular heart beat.  Respiratory: Denies cough or shortness of breath.  Gastrointestinal: Denies abdominal pain, nausea, or vomiting.  Musculoskeletal: Positive for varicose veins with itching at right leg.  Neurological: Denies dizziness or focal weakness.  Psychiatric/Behavioral: Normal mental status.  Hematologic/Lymphatic: Denies bleeding problem or easy bruising/bleeding.  Skin: Denies rash or suspicious lesions    Physical Examination  Pulse 78   Ht 5' 9" (1.753 m)   Wt 95.3 kg (210 lb)   BMI 31.01 kg/m²     Constitutional: No acute distress, conversant  HEENT: Sclera anicteric, Pupils equal, round and reactive to light, extraocular motions intact, Oropharynx clear  Neck: No JVD, no carotid bruits  Cardiovascular: regular rate and rhythm, no murmur, rubs or gallops, normal S1/S2  Pulmonary: Clear to auscultation bilaterally  Abdominal: Abdomen soft, nontender, nondistended, positive bowel sounds  Extremities: RLE with cluster of varicose veins at calf  BLE's with no pitting edema and changes consistent with mild lymphedema  Pulses:  Carotid pulses are 2+ on the right side, and 2+ on the left side.  Radial pulses are 2+ on the right side, and 2+ on the left side.   Femoral pulses are 2+ on the " "right side, and 2+ on the left side.  Popliteal pulses are 2+ on the right side, and 2+ on the left side.   Dorsalis pedis pulses are 2+ on the right side, and 2+ on the left side.   Posterior tibial pulses are 2+ on the right side, and 2+ on the left side.    Skin: No ecchymosis, erythema, or ulcers  Psych: Alert and oriented x 3, appropriate affect  Neuro: CNII-XII intact, no focal deficits    Labs:  Most Recent Data  CBC:   Lab Results   Component Value Date    WBC 7.31 09/28/2023    HGB 15.1 09/28/2023    HCT 43.3 09/28/2023     09/28/2023    MCV 94 09/28/2023    RDW 13.1 09/28/2023     BMP:   Lab Results   Component Value Date     09/28/2023    K 4.6 09/28/2023     09/28/2023    CO2 28 09/28/2023    BUN 14 09/28/2023    CREATININE 0.9 09/28/2023    GLU 87 09/28/2023    CALCIUM 9.1 09/28/2023    MG 2.2 01/20/2018    PHOS 2.3 (L) 01/20/2018     LFTS;   Lab Results   Component Value Date    PROT 7.2 09/28/2023    ALBUMIN 4.1 09/28/2023    BILITOT 0.6 09/28/2023    AST 20 09/28/2023    ALKPHOS 63 09/28/2023    ALT 26 09/28/2023     COAGS:   Lab Results   Component Value Date    INR 1.0 01/17/2018     FLP:   Lab Results   Component Value Date    CHOL 186 09/28/2023    HDL 49 09/28/2023    LDLCALC 116.2 09/28/2023    TRIG 104 09/28/2023    CHOLHDL 26.3 09/28/2023     CARDIAC: No results found for: "TROPONINI", "CKTOTAL", "CKMB", "BNP"    Imaging:    ELLYN 10/31/2023:   Normal rest and exercise ABIs and waveforms are normal.     BLE Venous Reflux Study 10/31/2023:    There is no evidence of a right lower extremity DVT.    There is no evidence of a left lower extremity DVT.    The right common femoral vein has reflux.    The right greater saphenous vein has reflux.    The right lesser saphenous vein has reflux.    The left common femoral vein has reflux.    The left greater saphenous vein has reflux.    Assessment/Plan:  Thuan Reynolds Jr. is a 44 y.o. male with venous insufficiency, Sertoli " cell-only syndrome, obesity, who presents for a follow up appointment.    Varicose veins of lower extremity- Mr. Reynolds reports continued pain and discomfort at cluster of varicose veins at right calf area despite wearing compression hose and limiting sodium intake for several months.  He was evaluated by Dr. Braga and is scheduled for to undergo right GSV ablation with VenaSeal on 10/17/2024.  Continue wearing graduated compression hose.  Limit sodium intake to 2000 mg daily.  Limit volume intake to 1.5 L daily.  Elevate legs when resting.    2. Obesity- Encourage diet, exercise, and weight loss.     Follow up in 5 months     Total duration of face to face visit time 30 minutes.  Total time spent counseling greater than fifty percent of total visit time.  Counseling included discussion regarding imaging findings, diagnosis, possibilities, treatment options, risks and benefits.  The patient had many questions regarding the options and long-term effects.    Darryl Moncada MD, PhD  Interventional Cardiology

## 2024-10-14 ENCOUNTER — TELEPHONE (OUTPATIENT)
Dept: CARDIOLOGY | Facility: CLINIC | Age: 44
End: 2024-10-14
Payer: COMMERCIAL

## 2024-10-14 NOTE — TELEPHONE ENCOUNTER
Pt called with questions regarding procedure scheduled on 10/17/24, instructions state he is to take a valium 1 hr before and bring his compression stockings.  Attempting to clarify for patient.  Pt voiced ok.

## 2024-10-14 NOTE — TELEPHONE ENCOUNTER
Call back to patient -  notification that Rx is to be called in pre procedure instructions. Pt states [facility] is his primary pharmacy.  Pt states understanding to bring compression stockings to outpatient procedure for post op use.  Pt with additional question on if he can work the same day as the procedure.  Forwarded message to Dr Omi murphy nurse.  Pt voiced understanding.

## 2024-10-17 ENCOUNTER — HOSPITAL ENCOUNTER (OUTPATIENT)
Dept: CARDIOLOGY | Facility: HOSPITAL | Age: 44
Discharge: HOME | End: 2024-10-17
Attending: INTERNAL MEDICINE
Payer: COMMERCIAL

## 2024-10-17 VITALS — SYSTOLIC BLOOD PRESSURE: 130 MMHG | HEART RATE: 91 BPM | RESPIRATION RATE: 16 BRPM | DIASTOLIC BLOOD PRESSURE: 86 MMHG

## 2024-10-17 DIAGNOSIS — I83.11 VARICOSE VEINS OF BOTH LOWER EXTREMITIES WITH INFLAMMATION: ICD-10-CM

## 2024-10-17 DIAGNOSIS — I83.12 VARICOSE VEINS OF BOTH LOWER EXTREMITIES WITH INFLAMMATION: ICD-10-CM

## 2024-10-17 PROCEDURE — 27202390 CV CHEMICAL ADHESIVE TREATMENT - SINGLE VEIN (CUPID ONLY)

## 2024-10-24 ENCOUNTER — HOSPITAL ENCOUNTER (EMERGENCY)
Facility: HOSPITAL | Age: 44
Discharge: HOME | End: 2024-10-24
Attending: STUDENT IN AN ORGANIZED HEALTH CARE EDUCATION/TRAINING PROGRAM
Payer: COMMERCIAL

## 2024-10-24 VITALS
RESPIRATION RATE: 20 BRPM | HEART RATE: 76 BPM | WEIGHT: 210 LBS | OXYGEN SATURATION: 97 % | SYSTOLIC BLOOD PRESSURE: 136 MMHG | TEMPERATURE: 98 F | HEIGHT: 69 IN | BODY MASS INDEX: 31.1 KG/M2 | DIASTOLIC BLOOD PRESSURE: 94 MMHG

## 2024-10-24 DIAGNOSIS — H92.02 OTALGIA OF LEFT EAR: ICD-10-CM

## 2024-10-24 DIAGNOSIS — H61.892 SWELLING OF LEFT EXTERNAL EAR: ICD-10-CM

## 2024-10-24 DIAGNOSIS — H61.002 PERICHONDRITIS OF AURICLE, LEFT: Primary | ICD-10-CM

## 2024-10-24 LAB
ANION GAP SERPL CALC-SCNC: 9 MMOL/L (ref 8–16)
BASOPHILS # BLD AUTO: 0.03 K/UL (ref 0–0.2)
BASOPHILS NFR BLD: 0.4 % (ref 0–1.9)
BUN SERPL-MCNC: 13 MG/DL (ref 6–20)
CALCIUM SERPL-MCNC: 9.4 MG/DL (ref 8.7–10.5)
CHLORIDE SERPL-SCNC: 103 MMOL/L (ref 95–110)
CO2 SERPL-SCNC: 26 MMOL/L (ref 23–29)
CREAT SERPL-MCNC: 0.8 MG/DL (ref 0.5–1.4)
DIFFERENTIAL METHOD BLD: ABNORMAL
EOSINOPHIL # BLD AUTO: 0.2 K/UL (ref 0–0.5)
EOSINOPHIL NFR BLD: 2.5 % (ref 0–8)
ERYTHROCYTE [DISTWIDTH] IN BLOOD BY AUTOMATED COUNT: 12.2 % (ref 11.5–14.5)
EST. GFR  (NO RACE VARIABLE): >60 ML/MIN/1.73 M^2
GLUCOSE SERPL-MCNC: 104 MG/DL (ref 70–110)
HCT VFR BLD AUTO: 40.1 % (ref 40–54)
HGB BLD-MCNC: 14 G/DL (ref 14–18)
IMM GRANULOCYTES # BLD AUTO: 0.02 K/UL (ref 0–0.04)
IMM GRANULOCYTES NFR BLD AUTO: 0.3 % (ref 0–0.5)
LYMPHOCYTES # BLD AUTO: 1.9 K/UL (ref 1–4.8)
LYMPHOCYTES NFR BLD: 25.7 % (ref 18–48)
MCH RBC QN AUTO: 32.6 PG (ref 27–31)
MCHC RBC AUTO-ENTMCNC: 34.9 G/DL (ref 32–36)
MCV RBC AUTO: 93 FL (ref 82–98)
MONOCYTES # BLD AUTO: 0.9 K/UL (ref 0.3–1)
MONOCYTES NFR BLD: 11.8 % (ref 4–15)
NEUTROPHILS # BLD AUTO: 4.5 K/UL (ref 1.8–7.7)
NEUTROPHILS NFR BLD: 59.3 % (ref 38–73)
NRBC BLD-RTO: 0 /100 WBC
PLATELET # BLD AUTO: 272 K/UL (ref 150–450)
PMV BLD AUTO: 9.3 FL (ref 9.2–12.9)
POTASSIUM SERPL-SCNC: 3.6 MMOL/L (ref 3.5–5.1)
RBC # BLD AUTO: 4.3 M/UL (ref 4.6–6.2)
SODIUM SERPL-SCNC: 138 MMOL/L (ref 136–145)
WBC # BLD AUTO: 7.52 K/UL (ref 3.9–12.7)

## 2024-10-24 PROCEDURE — 96374 THER/PROPH/DIAG INJ IV PUSH: CPT

## 2024-10-24 PROCEDURE — 85025 COMPLETE CBC W/AUTO DIFF WBC: CPT | Performed by: PHYSICIAN ASSISTANT

## 2024-10-24 PROCEDURE — 99285 EMERGENCY DEPT VISIT HI MDM: CPT | Mod: 25

## 2024-10-24 PROCEDURE — 25500020 PHARM REV CODE 255: Performed by: STUDENT IN AN ORGANIZED HEALTH CARE EDUCATION/TRAINING PROGRAM

## 2024-10-24 PROCEDURE — 63600175 PHARM REV CODE 636 W HCPCS: Performed by: EMERGENCY MEDICINE

## 2024-10-24 PROCEDURE — 80048 BASIC METABOLIC PNL TOTAL CA: CPT | Performed by: PHYSICIAN ASSISTANT

## 2024-10-24 RX ORDER — IBUPROFEN 800 MG/1
800 TABLET ORAL EVERY 6 HOURS PRN
Qty: 12 TABLET | Refills: 0 | Status: SHIPPED | OUTPATIENT
Start: 2024-10-24 | End: 2024-10-27

## 2024-10-24 RX ORDER — PREDNISONE 50 MG/1
50 TABLET ORAL DAILY
Qty: 5 TABLET | Refills: 0 | Status: SHIPPED | OUTPATIENT
Start: 2024-10-24 | End: 2024-10-29

## 2024-10-24 RX ORDER — CIPROFLOXACIN 250 MG/1
250 TABLET, FILM COATED ORAL 2 TIMES DAILY
Qty: 14 TABLET | Refills: 0 | Status: SHIPPED | OUTPATIENT
Start: 2024-10-24 | End: 2024-10-31

## 2024-10-24 RX ORDER — KETOROLAC TROMETHAMINE 30 MG/ML
15 INJECTION, SOLUTION INTRAMUSCULAR; INTRAVENOUS
Status: COMPLETED | OUTPATIENT
Start: 2024-10-24 | End: 2024-10-24

## 2024-10-24 RX ADMIN — KETOROLAC TROMETHAMINE 15 MG: 30 INJECTION, SOLUTION INTRAMUSCULAR; INTRAVENOUS at 05:10

## 2024-10-24 RX ADMIN — IOHEXOL 100 ML: 350 INJECTION, SOLUTION INTRAVENOUS at 02:10

## 2024-11-15 ENCOUNTER — OCCUPATIONAL HEALTH (OUTPATIENT)
Dept: URGENT CARE | Facility: CLINIC | Age: 44
End: 2024-11-15

## 2024-11-15 DIAGNOSIS — Z23 ENCOUNTER FOR IMMUNIZATION: Primary | ICD-10-CM

## 2024-11-19 ENCOUNTER — PATIENT MESSAGE (OUTPATIENT)
Dept: CARDIOLOGY | Facility: CLINIC | Age: 44
End: 2024-11-19
Payer: COMMERCIAL

## 2024-11-20 DIAGNOSIS — I83.11 VARICOSE VEINS OF BOTH LOWER EXTREMITIES WITH INFLAMMATION: Primary | ICD-10-CM

## 2024-11-20 DIAGNOSIS — I83.12 VARICOSE VEINS OF BOTH LOWER EXTREMITIES WITH INFLAMMATION: Primary | ICD-10-CM

## 2024-11-26 ENCOUNTER — HOSPITAL ENCOUNTER (OUTPATIENT)
Dept: CARDIOLOGY | Facility: HOSPITAL | Age: 44
Discharge: HOME | End: 2024-11-26
Attending: INTERNAL MEDICINE
Payer: COMMERCIAL

## 2024-11-26 ENCOUNTER — OFFICE VISIT (OUTPATIENT)
Dept: CARDIOLOGY | Facility: CLINIC | Age: 44
End: 2024-11-26
Payer: COMMERCIAL

## 2024-11-26 VITALS
SYSTOLIC BLOOD PRESSURE: 139 MMHG | HEART RATE: 58 BPM | OXYGEN SATURATION: 98 % | HEIGHT: 69 IN | DIASTOLIC BLOOD PRESSURE: 83 MMHG | WEIGHT: 220.44 LBS | BODY MASS INDEX: 32.65 KG/M2

## 2024-11-26 DIAGNOSIS — I83.11 VARICOSE VEINS OF BOTH LOWER EXTREMITIES WITH INFLAMMATION: ICD-10-CM

## 2024-11-26 DIAGNOSIS — I83.11 VARICOSE VEINS OF BOTH LOWER EXTREMITIES WITH INFLAMMATION: Primary | ICD-10-CM

## 2024-11-26 DIAGNOSIS — I83.12 VARICOSE VEINS OF BOTH LOWER EXTREMITIES WITH INFLAMMATION: Primary | ICD-10-CM

## 2024-11-26 DIAGNOSIS — I83.12 VARICOSE VEINS OF BOTH LOWER EXTREMITIES WITH INFLAMMATION: ICD-10-CM

## 2024-11-26 LAB
RIGHT GREAT SAPHENOUS KNEE DIA: 0.44 CM
RIGHT GREAT SAPHENOUS KNEE REFLUX: 4835 MS
RIGHT GREAT SAPHENOUS PROXIMAL CALF DIA: 0.33 CM
RIGHT SMALL SAPHENOUS KNEE DIA: 0.36 CM
RIGHT SMALL SAPHENOUS SPJ DIA: 0.3 CM

## 2024-11-26 PROCEDURE — 3008F BODY MASS INDEX DOCD: CPT | Mod: CPTII,S$GLB,,

## 2024-11-26 PROCEDURE — 3079F DIAST BP 80-89 MM HG: CPT | Mod: CPTII,S$GLB,,

## 2024-11-26 PROCEDURE — 1160F RVW MEDS BY RX/DR IN RCRD: CPT | Mod: CPTII,S$GLB,,

## 2024-11-26 PROCEDURE — 93971 EXTREMITY STUDY: CPT | Mod: 26,RT,, | Performed by: INTERNAL MEDICINE

## 2024-11-26 PROCEDURE — 93971 EXTREMITY STUDY: CPT | Mod: TC,RT

## 2024-11-26 PROCEDURE — 99214 OFFICE O/P EST MOD 30 MIN: CPT | Mod: S$GLB,,,

## 2024-11-26 PROCEDURE — 1159F MED LIST DOCD IN RCRD: CPT | Mod: CPTII,S$GLB,,

## 2024-11-26 PROCEDURE — 3075F SYST BP GE 130 - 139MM HG: CPT | Mod: CPTII,S$GLB,,

## 2024-11-26 PROCEDURE — 99999 PR PBB SHADOW E&M-EST. PATIENT-LVL III: CPT | Mod: PBBFAC,,,

## 2024-11-26 NOTE — ASSESSMENT & PLAN NOTE
- Asymptomatic at this time, no concerns following procedure  - Venous competency study of right leg reviewed today, shows R GSV is occluded from prox calf to prox thigh. No DVT noted. R GSV mid calf with continued reflux. No symptoms reported.  - Continue follow up regularly with general cardiologist  - Follow up with interventional cardiology clinic as needed or if symptoms begin  - Recommendations: Continue wearing graduated compression stockings. Elevate legs when resting. Limit sodium intake to 2000 mg daily. Limit volume intake to 1.5 L daily.

## 2024-11-26 NOTE — PROGRESS NOTES
Interventional Cardiology Clinic Note    General Cardiologist: Dr. Moncada    HPI:     Thuan Reynolds Jr. is a 44 y.o. male with Seroti-cell only syndrome, varicose veins who presents for 1 month Venaseal follow-up.    Patient had Venaseal of Right GSV from prox calf to prox thigh on 10/17/2024 by Dr. Braga. Ultrasound confirmed complete coaptation and closure of the treated segments of the GSV, and the absence of any DVT at the saphenofemoral junction. Pt presents today for follow-up with repeat venous competency study right leg.    Today, patient reports significant improvement in symptoms following procedure. Denies any more leg pain or itching. States that swelling has subsided. Denies skin discoloration or new ulcers. He was wearing the full length compression stocking but has starting using below knee socks. No other concerns at this time.    ROS:      Review of Systems   Constitutional: Negative for fever.   Cardiovascular:  Negative for leg swelling.       PMH:     Past Medical History:   Diagnosis Date    Bell's palsy     Otitis media     SDH (subdural hematoma)     BILAT SDH/SAH    Seizure     Sertoli cell-only syndrome     Skull vault fx      Past Surgical History:   Procedure Laterality Date    VARIOCOCELE REPAIR  2008    left side     Allergies:   Review of patient's allergies indicates:  No Known Allergies  Medications:     Current Outpatient Medications on File Prior to Visit   Medication Sig Dispense Refill    ciprofloxacin-dexAMETHasone 0.3-0.1% (CIPRODEX) 0.3-0.1 % DrpS Use 4 gtts in affected ear BID for 5-7 days (Patient not taking: Reported on 11/26/2024) 5 mL 0    multivitamin (ONE DAILY MULTIVITAMIN) per tablet Take 1 tablet by mouth once daily.       No current facility-administered medications on file prior to visit.     Social History:     Social History     Tobacco Use    Smoking status: Every Day     Current packs/day: 0.00     Types: Cigarettes    Smokeless tobacco: Never     "Tobacco comments:     1 pack a week   Substance Use Topics    Alcohol use: Yes     Alcohol/week: 5.0 standard drinks of alcohol     Types: 5 Cans of beer per week     Comment: once a week; 2-3 beers     Family History:     Family History   Problem Relation Name Age of Onset    Alcohol abuse Mother Miracle Reynolds     Vision loss Mother Miracle Reynolds     Macular degeneration Mother Miracle Reynolds     Diabetes Father Thuan Reynolds     Heart failure Father Thuan Reynolds     Early death Father Thuan Reynolds     Heart disease Father Thuan Reynolds         MI in 40s and CABG    Hyperlipidemia Father Thuan Reynolds     Hypertension Father Thuan Reynolds     Liver disease Father Thuan Reynolds     Alcohol abuse Father Thuan Reynolds     Liver disease Paternal Uncle          primary sclerosing cholangitis    Cancer Maternal Grandfather Alec Bailonjr         throat - smoker and drinker    Stroke Neg Hx       Physical Exam:   /83 (BP Location: Right arm, Patient Position: Sitting)   Pulse (!) 58   Ht 5' 9" (1.753 m)   Wt 100 kg (220 lb 7.4 oz)   SpO2 98%   BMI 32.56 kg/m²        Physical Exam  Vitals and nursing note reviewed.   Constitutional:       General: He is not in acute distress.     Appearance: Normal appearance. He is not ill-appearing or toxic-appearing.   HENT:      Head: Normocephalic and atraumatic.   Eyes:      Pupils: Pupils are equal, round, and reactive to light.   Cardiovascular:      Rate and Rhythm: Normal rate and regular rhythm.      Pulses: Normal pulses.      Heart sounds: Normal heart sounds. No murmur heard.     No friction rub. No gallop.   Pulmonary:      Effort: Pulmonary effort is normal. No respiratory distress.   Musculoskeletal:         General: No swelling or tenderness. Normal range of motion.      Cervical back: Normal range of motion.      Right lower leg: No swelling or tenderness. No edema.   Skin:     " "General: Skin is warm and dry.      Capillary Refill: Capillary refill takes less than 2 seconds.   Neurological:      General: No focal deficit present.      Mental Status: He is alert.          Labs:     Lab Results   Component Value Date     10/24/2024    K 3.6 10/24/2024     10/24/2024    CO2 26 10/24/2024    BUN 13 10/24/2024    CREATININE 0.8 10/24/2024    ANIONGAP 9 10/24/2024     Lab Results   Component Value Date    HGBA1C 4.8 09/28/2023     No results found for: "BNP", "BNPTRIAGEBLO" Lab Results   Component Value Date    WBC 7.52 10/24/2024    HGB 14.0 10/24/2024    HCT 40.1 10/24/2024     10/24/2024    GRAN 4.5 10/24/2024    GRAN 59.3 10/24/2024     Lab Results   Component Value Date    CHOL 186 09/28/2023    HDL 49 09/28/2023    LDLCALC 116.2 09/28/2023    TRIG 104 09/28/2023          Lipids:  Recent Labs   Lab 09/28/23  0715   LDL Cholesterol 116.2   HDL 49      Renal:  Recent Labs   Lab 10/24/24  0048   Creatinine 0.8   Potassium 3.6   CO2 26   BUN 13     Liver:  Recent Labs   Lab 09/28/23  0715   AST 20   ALT 26       Imaging:     Venous competency study Right leg (11/26/2024):  Right Lower Venous: There is no evidence of a right lower extremity DVT.  Right Venous Insufficiency Duplex:  The right common femoral vein has reflux.   The right greater saphenous vein has reflux.   The right smaller saphenous vein does not have reflux.   Rt GSV appears occluded prox calf - prox thigh s/p Venaseal treatment.  Rt GSV reflux at calf level.   Exam performed with patient in reverse Trendelenburg position.   Left: The left common femoral vein is normal.      Assessment & Plan:     1. Varicose veins of both lower extremities with inflammation      Varicose veins of both lower extremities with inflammation  - Asymptomatic at this time, no concerns following procedure  - Venous competency study of right leg reviewed today, shows R GSV is occluded from prox calf to prox thigh. No DVT noted. R GSV " mid calf with continued reflux. No symptoms reported.  - Continue follow up regularly with general cardiologist  - Follow up with interventional cardiology clinic as needed or if symptoms begin  - Recommendations: Continue wearing graduated compression stockings. Elevate legs when resting. Limit sodium intake to 2000 mg daily. Limit volume intake to 1.5 L daily.     Follow-up with general cardiologist/PCP for regular visits. Follow-up with interventional cardiology clinic as needed.    Signed:  Mary Cantrell PA-C  Interventional Cardiology

## 2024-12-03 ENCOUNTER — TELEPHONE (OUTPATIENT)
Dept: OTOLARYNGOLOGY | Facility: CLINIC | Age: 44
End: 2024-12-03
Payer: COMMERCIAL

## 2024-12-03 NOTE — TELEPHONE ENCOUNTER
Called pt. To reschedule because of pt. Surgery that morning. No answer left message with call back information

## 2024-12-12 ENCOUNTER — TELEPHONE (OUTPATIENT)
Dept: OTOLARYNGOLOGY | Facility: CLINIC | Age: 44
End: 2024-12-12
Payer: COMMERCIAL

## 2024-12-12 NOTE — TELEPHONE ENCOUNTER
Called pt. No answer left message to call back with call back information for rescheduling of appt.

## 2025-02-20 ENCOUNTER — CLINICAL SUPPORT (OUTPATIENT)
Dept: OTHER | Facility: CLINIC | Age: 45
End: 2025-02-20

## 2025-02-20 DIAGNOSIS — Z00.8 ENCOUNTER FOR OTHER GENERAL EXAMINATION: ICD-10-CM

## 2025-03-04 ENCOUNTER — OFFICE VISIT (OUTPATIENT)
Dept: URGENT CARE | Facility: CLINIC | Age: 45
End: 2025-03-04
Payer: COMMERCIAL

## 2025-03-04 VITALS
OXYGEN SATURATION: 97 % | SYSTOLIC BLOOD PRESSURE: 143 MMHG | HEIGHT: 69 IN | BODY MASS INDEX: 32.58 KG/M2 | TEMPERATURE: 99 F | RESPIRATION RATE: 20 BRPM | WEIGHT: 220 LBS | HEART RATE: 95 BPM | DIASTOLIC BLOOD PRESSURE: 93 MMHG

## 2025-03-04 DIAGNOSIS — H61.001 PERICHONDRITIS OF RIGHT PINNA: Primary | ICD-10-CM

## 2025-03-04 DIAGNOSIS — H60.91 OTITIS EXTERNA OF RIGHT EAR, UNSPECIFIED CHRONICITY, UNSPECIFIED TYPE: ICD-10-CM
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
## 2025-03-04 DIAGNOSIS — S69.92XA INJURY OF LEFT HAND, INITIAL ENCOUNTER: ICD-10-CM

## 2025-03-04 PROCEDURE — 99214 OFFICE O/P EST MOD 30 MIN: CPT | Mod: S$GLB,,, | Performed by: SURGERY

## 2025-03-04 PROCEDURE — 73130 X-RAY EXAM OF HAND: CPT | Mod: LT,S$GLB,, | Performed by: RADIOLOGY

## 2025-03-04 RX ORDER — AMOXICILLIN AND CLAVULANATE POTASSIUM 875; 125 MG/1; MG/1
1 TABLET, FILM COATED ORAL EVERY 12 HOURS
Qty: 14 TABLET | Refills: 0 | Status: SHIPPED | OUTPATIENT
Start: 2025-03-04

## 2025-03-04 RX ORDER — PREDNISONE 20 MG/1
40 TABLET ORAL DAILY
Qty: 10 TABLET | Refills: 0 | Status: SHIPPED | OUTPATIENT
Start: 2025-03-04 | End: 2025-03-09

## 2025-03-04 RX ORDER — IBUPROFEN 800 MG/1
800 TABLET ORAL EVERY 8 HOURS PRN
Qty: 60 TABLET | Refills: 0 | Status: SHIPPED | OUTPATIENT
Start: 2025-03-04 | End: 2026-03-04

## 2025-03-04 NOTE — PROGRESS NOTES
"Subjective:      Patient ID: Thuan Reynolds Jr. is a 44 y.o. male.    Vitals:  height is 5' 9" (1.753 m) and weight is 99.8 kg (220 lb). His oral temperature is 98.8 °F (37.1 °C). His blood pressure is 143/93 (abnormal) and his pulse is 95. His respiration is 20 and oxygen saturation is 97%.     Chief Complaint: Hand Injury and Otalgia    This is a 44 y.o. male who presents today with a chief complaint of right ear lobe swelling, redness, & warm too touch that began this morning.  Rates pain 2/10 if touching his right ear.    Patient c/o left hand pain & swelling that occurred 2 days ago.  Patient states he was walking to his car and tripped.  Patient is uncertain on how he injured his hand while falling.     Hand Injury   His dominant hand is their right hand. The incident occurred 2 days ago. The incident occurred in the street. The injury mechanism was a fall. The pain is present in the left hand. The quality of the pain is described as aching. The pain does not radiate. The pain is at a severity of 3/10. The pain is mild. The pain has been Intermittent since the incident. Pertinent negatives include no chest pain, muscle weakness, numbness or tingling. The symptoms are aggravated by movement and palpation. He has tried nothing for the symptoms.   Otalgia   There is pain in the right ear. This is a new problem. The current episode started today. The problem occurs constantly. The problem has been gradually worsening. There has been no fever. The pain is at a severity of 2/10. The pain is mild. Pertinent negatives include no abdominal pain, coughing, diarrhea, ear discharge, headaches, hearing loss, neck pain, rash, rhinorrhea, sore throat or vomiting. He has tried nothing for the symptoms.     HENT:  Positive for ear pain. Negative for ear discharge, hearing loss and sore throat.    Neck: Negative for neck pain.   Cardiovascular:  Negative for chest pain.   Respiratory:  Negative for cough.  "   Gastrointestinal:  Negative for abdominal pain, vomiting and diarrhea.   Musculoskeletal:  Positive for pain, joint pain and joint swelling.   Skin:  Negative for rash.   Neurological:  Negative for headaches and numbness.      Objective:     Physical Exam   Constitutional: He is oriented to person, place, and time. He appears well-developed. He is cooperative.  Non-toxic appearance. He does not appear ill. No distress.   HENT:   Head: Normocephalic and atraumatic.   Ears:   Right Ear: Hearing normal. There is swelling and tenderness.   Left Ear: Hearing, tympanic membrane, external ear and ear canal normal.   Ears:    Nose: Nose normal. No mucosal edema, rhinorrhea or nasal deformity. No epistaxis. Right sinus exhibits no maxillary sinus tenderness and no frontal sinus tenderness. Left sinus exhibits no maxillary sinus tenderness and no frontal sinus tenderness.   Mouth/Throat: Uvula is midline, oropharynx is clear and moist and mucous membranes are normal. No trismus in the jaw. Normal dentition. No uvula swelling. No oropharyngeal exudate, posterior oropharyngeal edema or posterior oropharyngeal erythema.   Eyes: Conjunctivae and lids are normal. No scleral icterus.   Neck: Trachea normal and phonation normal. Neck supple. No edema present. No erythema present. No neck rigidity present.   Cardiovascular: Normal rate, regular rhythm, normal heart sounds and normal pulses.   Pulmonary/Chest: Effort normal and breath sounds normal. No respiratory distress. He has no decreased breath sounds. He has no rhonchi.   Abdominal: Normal appearance.   Musculoskeletal: Normal range of motion.         General: No deformity. Normal range of motion.        Hands:    Neurological: He is alert and oriented to person, place, and time. He exhibits normal muscle tone. Coordination normal.   Skin: Skin is warm, dry, intact, not diaphoretic and not pale.   Psychiatric: His speech is normal and behavior is normal. Judgment and thought  content normal.   Nursing note and vitals reviewed.      Assessment:     1. Perichondritis of right pinna    2. Otitis externa of right ear, unspecified chronicity, unspecified type    3. Injury of left hand, initial encounter    4. Fall, initial encounter        Plan:       Perichondritis of right pinna  -     amoxicillin-clavulanate 875-125mg (AUGMENTIN) 875-125 mg per tablet; Take 1 tablet by mouth every 12 (twelve) hours.  Dispense: 14 tablet; Refill: 0  -     ibuprofen (ADVIL,MOTRIN) 800 MG tablet; Take 1 tablet (800 mg total) by mouth every 8 (eight) hours as needed for Pain.  Dispense: 60 tablet; Refill: 0  -     predniSONE (DELTASONE) 20 MG tablet; Take 2 tablets (40 mg total) by mouth once daily. for 5 days  Dispense: 10 tablet; Refill: 0    Otitis externa of right ear, unspecified chronicity, unspecified type    Injury of left hand, initial encounter  -     XR HAND COMPLETE 3 VIEW LEFT; Future; Expected date: 03/04/2025    Fall, initial encounter  -     XR HAND COMPLETE 3 VIEW LEFT; Future; Expected date: 03/04/2025        Patient with recurrent inflammation of the pinna.  He had previous episode of the ear the ear about 6 months ago.  It was treated with steroids at the time he had a negative CT scan at the time for much more severe episode.  I will go ahead and treated as primarily inflammatory inflammation.  He will follow-up with ENT if needed.  He will call if not improved within 2-3 days.              Type of Interpretation: ED Physician (Independently Interpreted).  Radiology Procedure Done: Left Hand.  Interpretation: No fracture, no dislocation

## 2025-03-07 VITALS
SYSTOLIC BLOOD PRESSURE: 126 MMHG | HEIGHT: 69 IN | DIASTOLIC BLOOD PRESSURE: 82 MMHG | BODY MASS INDEX: 31.99 KG/M2 | WEIGHT: 216 LBS

## 2025-03-07 LAB
GLUCOSE SERPL-MCNC: 84 MG/DL (ref 60–140)
HDLC SERPL-MCNC: 43 MG/DL
POC CHOLESTEROL, LDL (DOCK): 96 MG/DL
POC CHOLESTEROL, TOTAL: 165 MG/DL
TRIGL SERPL-MCNC: 149 MG/DL

## 2025-03-24 ENCOUNTER — PATIENT MESSAGE (OUTPATIENT)
Dept: INTERNAL MEDICINE | Facility: CLINIC | Age: 45
End: 2025-03-24
Payer: COMMERCIAL

## 2025-05-20 ENCOUNTER — LAB VISIT (OUTPATIENT)
Dept: LAB | Facility: HOSPITAL | Age: 45
End: 2025-05-20
Attending: INTERNAL MEDICINE
Payer: COMMERCIAL

## 2025-05-20 ENCOUNTER — OFFICE VISIT (OUTPATIENT)
Dept: INTERNAL MEDICINE | Facility: CLINIC | Age: 45
End: 2025-05-20
Payer: COMMERCIAL

## 2025-05-20 VITALS
TEMPERATURE: 98 F | BODY MASS INDEX: 33.06 KG/M2 | DIASTOLIC BLOOD PRESSURE: 100 MMHG | SYSTOLIC BLOOD PRESSURE: 140 MMHG | HEART RATE: 65 BPM | WEIGHT: 223.19 LBS | OXYGEN SATURATION: 97 % | HEIGHT: 69 IN

## 2025-05-20 DIAGNOSIS — Z12.5 PROSTATE CANCER SCREENING: ICD-10-CM

## 2025-05-20 DIAGNOSIS — Z00.00 ANNUAL PHYSICAL EXAM: ICD-10-CM

## 2025-05-20 DIAGNOSIS — Z00.00 ANNUAL PHYSICAL EXAM: Primary | ICD-10-CM

## 2025-05-20 DIAGNOSIS — I83.93 VARICOSE VEINS OF BOTH LOWER EXTREMITIES, UNSPECIFIED WHETHER COMPLICATED: ICD-10-CM

## 2025-05-20 DIAGNOSIS — I10 PRIMARY HYPERTENSION: ICD-10-CM

## 2025-05-20 LAB
ABSOLUTE EOSINOPHIL (OHS): 0.18 K/UL
ABSOLUTE MONOCYTE (OHS): 0.67 K/UL (ref 0.3–1)
ABSOLUTE NEUTROPHIL COUNT (OHS): 6.65 K/UL (ref 1.8–7.7)
ALBUMIN SERPL BCP-MCNC: 4.1 G/DL (ref 3.5–5.2)
ALP SERPL-CCNC: 64 UNIT/L (ref 40–150)
ALT SERPL W/O P-5'-P-CCNC: 25 UNIT/L (ref 10–44)
ANION GAP (OHS): 15 MMOL/L (ref 8–16)
AST SERPL-CCNC: 22 UNIT/L (ref 11–45)
BASOPHILS # BLD AUTO: 0.03 K/UL
BASOPHILS NFR BLD AUTO: 0.3 %
BILIRUB SERPL-MCNC: 0.8 MG/DL (ref 0.1–1)
BUN SERPL-MCNC: 16 MG/DL (ref 6–20)
CALCIUM SERPL-MCNC: 9.4 MG/DL (ref 8.7–10.5)
CHLORIDE SERPL-SCNC: 100 MMOL/L (ref 95–110)
CHOLEST SERPL-MCNC: 205 MG/DL (ref 120–199)
CHOLEST/HDLC SERPL: 3.5 {RATIO} (ref 2–5)
CO2 SERPL-SCNC: 22 MMOL/L (ref 23–29)
CREAT SERPL-MCNC: 0.7 MG/DL (ref 0.5–1.4)
EAG (OHS): 91 MG/DL (ref 68–131)
ERYTHROCYTE [DISTWIDTH] IN BLOOD BY AUTOMATED COUNT: 12.6 % (ref 11.5–14.5)
GFR SERPLBLD CREATININE-BSD FMLA CKD-EPI: >60 ML/MIN/1.73/M2
GLUCOSE SERPL-MCNC: 72 MG/DL (ref 70–110)
HBA1C MFR BLD: 4.8 % (ref 4–5.6)
HCT VFR BLD AUTO: 43 % (ref 40–54)
HDLC SERPL-MCNC: 59 MG/DL (ref 40–75)
HDLC SERPL: 28.8 % (ref 20–50)
HGB BLD-MCNC: 15 GM/DL (ref 14–18)
IMM GRANULOCYTES # BLD AUTO: 0.03 K/UL (ref 0–0.04)
IMM GRANULOCYTES NFR BLD AUTO: 0.3 % (ref 0–0.5)
LDLC SERPL CALC-MCNC: 120.4 MG/DL (ref 63–159)
LYMPHOCYTES # BLD AUTO: 1.67 K/UL (ref 1–4.8)
MCH RBC QN AUTO: 32.6 PG (ref 27–31)
MCHC RBC AUTO-ENTMCNC: 34.9 G/DL (ref 32–36)
MCV RBC AUTO: 94 FL (ref 82–98)
NONHDLC SERPL-MCNC: 146 MG/DL
NUCLEATED RBC (/100WBC) (OHS): 0 /100 WBC
PLATELET # BLD AUTO: 314 K/UL (ref 150–450)
PMV BLD AUTO: 9.5 FL (ref 9.2–12.9)
POTASSIUM SERPL-SCNC: 4 MMOL/L (ref 3.5–5.1)
PROT SERPL-MCNC: 7.4 GM/DL (ref 6–8.4)
PSA SERPL-MCNC: 1.15 NG/ML
RBC # BLD AUTO: 4.6 M/UL (ref 4.6–6.2)
RELATIVE EOSINOPHIL (OHS): 2 %
RELATIVE LYMPHOCYTE (OHS): 18.1 % (ref 18–48)
RELATIVE MONOCYTE (OHS): 7.3 % (ref 4–15)
RELATIVE NEUTROPHIL (OHS): 72 % (ref 38–73)
SODIUM SERPL-SCNC: 137 MMOL/L (ref 136–145)
TRIGL SERPL-MCNC: 128 MG/DL (ref 30–150)
TSH SERPL-ACNC: 1.2 UIU/ML (ref 0.4–4)
WBC # BLD AUTO: 9.23 K/UL (ref 3.9–12.7)

## 2025-05-20 PROCEDURE — 3008F BODY MASS INDEX DOCD: CPT | Mod: CPTII,S$GLB,, | Performed by: INTERNAL MEDICINE

## 2025-05-20 PROCEDURE — 3080F DIAST BP >= 90 MM HG: CPT | Mod: CPTII,S$GLB,, | Performed by: INTERNAL MEDICINE

## 2025-05-20 PROCEDURE — 1160F RVW MEDS BY RX/DR IN RCRD: CPT | Mod: CPTII,S$GLB,, | Performed by: INTERNAL MEDICINE

## 2025-05-20 PROCEDURE — 84443 ASSAY THYROID STIM HORMONE: CPT

## 2025-05-20 PROCEDURE — 1159F MED LIST DOCD IN RCRD: CPT | Mod: CPTII,S$GLB,, | Performed by: INTERNAL MEDICINE

## 2025-05-20 PROCEDURE — 99999 PR PBB SHADOW E&M-EST. PATIENT-LVL III: CPT | Mod: PBBFAC,,, | Performed by: INTERNAL MEDICINE

## 2025-05-20 PROCEDURE — 84153 ASSAY OF PSA TOTAL: CPT

## 2025-05-20 PROCEDURE — 85025 COMPLETE CBC W/AUTO DIFF WBC: CPT

## 2025-05-20 PROCEDURE — 83036 HEMOGLOBIN GLYCOSYLATED A1C: CPT

## 2025-05-20 PROCEDURE — 80061 LIPID PANEL: CPT

## 2025-05-20 PROCEDURE — 80053 COMPREHEN METABOLIC PANEL: CPT

## 2025-05-20 PROCEDURE — 36415 COLL VENOUS BLD VENIPUNCTURE: CPT | Mod: PO

## 2025-05-20 PROCEDURE — 3077F SYST BP >= 140 MM HG: CPT | Mod: CPTII,S$GLB,, | Performed by: INTERNAL MEDICINE

## 2025-05-20 PROCEDURE — 99396 PREV VISIT EST AGE 40-64: CPT | Mod: S$GLB,,, | Performed by: INTERNAL MEDICINE

## 2025-05-20 RX ORDER — VALSARTAN 160 MG/1
160 TABLET ORAL DAILY
Qty: 90 TABLET | Refills: 3 | Status: SHIPPED | OUTPATIENT
Start: 2025-05-20 | End: 2026-05-20

## 2025-05-20 NOTE — PROGRESS NOTES
Assessment:       1. Annual physical exam  - CBC Auto Differential; Future  - Comprehensive Metabolic Panel; Future  - TSH; Future  - Lipid Panel; Future  - Hemoglobin A1C; Future    2. Prostate cancer screening  - PSA, Screening; Future    3. Varicose veins of both lower extremities, unspecified whether complicated    4. Primary hypertension  - valsartan (DIOVAN) 160 MG tablet; Take 1 tablet (160 mg total) by mouth once daily.  Dispense: 90 tablet; Refill: 3  - [facility] E-VISIT        Plan:       1. Check CBC, CMP, TSH, lipids, A1c, PSA.  Discussed diet and exercise.  Discussed vaccines.  2. Check PSA.    3. Monitor.  Improvement since procedures done.  4.  Valsartan 160 mg started.  E visit in 2 weeks.    Deep Scribe:  IMPRESSION:  1. BP elevated: 140/100 in office, 135-140/80 at home.  2. Initiated antihypertensive therapy given consistent elevated readings.  3. Tender lymph node likely reactive. Will monitor and consider imaging if persistent beyond 6 weeks.  4. Explained genetic component of HTN and limitations of lifestyle modifications alone.    SUMMARY:   Continue current exercise regimen   Initiate Valsartan 160 mg, 1 pill daily in the evening or before bedtime   Wean off vaporization first, then gradually reduce nicotine packet usage   Continue wearing compression stockings while working   Order labs to be done on the fifth floor   Consider ultrasound if neck tenderness persists after 6 weeks    ## ESSENTIAL HYPERTENSION:   Monitored the patient's blood pressure, which is running around 135 to 140 over 80 when checked at home, with a reading of 140 over 100 today.   Evaluated hypertension status requiring medication management.   Initiated Valsartan 160 mg, to be taken 1 pill daily in the evening or before bedtime, which is generally well-tolerated with minimal side effects.   Educated the patient about the rare side effect of angioedema with Valsartan, instructing to seek emergency care for any trouble  breathing or swallowing.    ## NICOTINE DEPENDENCE:   Noted the patient has quit smoking cigarettes but continues to use nicotine packets and vapes occasionally.   Advised the patient that inhaling tobacco products increases risk of cancer and lung disease.   Recommend the patient wean off vaporization first and then gradually reduce nicotine packet usage.   Discussed safer alternatives to smoking, such as nicotine salts or gum.    ## ENLARGED LYMPH NODES:   Noted the patient is experiencing tenderness in the neck area, likely due to a reactive lymph node possibly from pollen exposure.   Advised monitoring the tenderness for 6 weeks and considering an ultrasound if it persists.    ## LIFESTYLE-RELATED ISSUES:   Noted the patient drinks alcohol once or twice weekly, 2-3 drinks per sitting, and plays darts on Thursday nights.   Mr. Reynolds exercises 5 days a week, 30 minutes a day, and walks 2-2.5 miles regularly.   Encouraged continuation of current exercise regimen.    ## HISTORY OF VENOUS ISSUES:   Documented that the patient had varicosities treated with glue and laser sclerotherapy, resulting in significant improvement.   Mr. Reynolds underwent conservative therapy and wears compression stockings.   Mr. Reynolds reports significant reduction in varicosities and no longer experiences itching or pain.   Advised to continue wearing compression stockings while working.    ## FOLLOW-UP:   Ordered labs to be done on the fifth floor.                 This note was generated with the assistance of ambient listening technology. Verbal consent was obtained by the patient and accompanying visitor(s) for the recording of patient appointment to facilitate this note. I attest to having reviewed and edited the generated note for accuracy, though some syntax or spelling errors may persist. Please contact the author of this note for any clarification.       Subjective:       Patient ID: Thuan KYLE Reynolds Jr. is a  45 y.o. male.    Chief Complaint: Annual Exam    HPI    45 y.o. male here for annual exam.     Cholesterol: needs  Vaccines: Influenza - 2024; Tetanus - 2018; COVID - 3 done  Sexual Screening: active  STD screening: no concern  Eye exam: UTD  Prostate: needs  Colonoscopy: no family history of cancer  A1c: needs     Exercise: walks 2-2.5 miles in the am.  Diet: mix of fast food, home cooked, eating out    History of Present Illness    CHIEF COMPLAINT:  Mr. Reynolds presents today for annual exam    BLOOD PRESSURE:  He reports elevated blood pressure reading of 140/100. Home blood pressure readings typically range between 135-140/80, though he acknowledges inconsistent monitoring.    MEDICAL HISTORY:  History includes varicose veins treated with conservative therapy including compression stockings for 3 months followed by venous seal procedure in October with significant improvement. He had flu illness in February 2023, first occurrence since 2001. In March 2023, he sustained hand injury after slip and fall in [place] with associated ear swelling, similar to previous episode of ear swelling in October 2022.    NEW SYMPTOMS:  He noticed a new lymph node this morning while shaving.    SOCIAL HISTORY:  He consumes alcohol 1-2 times per week, typically 2-3 drinks per occasion during social activities. He has quit smoking cigarettes but currently uses zinc packets and vapes occasionally, expressing desire to quit. His diet consists of mixed intake including fast food, home-cooked meals, and restaurant dining.    PREVENTIVE CARE:  Recent eye exam completed with contact lens fitting. Flu shot received.      ROS:  Constitutional: +fevers, +body aches  Hematologic/Lymphatic: +neck lumps, +neck swelling         Review of Systems          Objective:      Physical Exam  Vitals reviewed.   Constitutional:       Appearance: He is well-developed.   HENT:      Head: Normocephalic and atraumatic.      Mouth/Throat:      Pharynx:  No oropharyngeal exudate.   Eyes:      General: No scleral icterus.        Right eye: No discharge.         Left eye: No discharge.      Pupils: Pupils are equal, round, and reactive to light.   Neck:      Thyroid: No thyromegaly.      Trachea: No tracheal deviation.   Cardiovascular:      Rate and Rhythm: Normal rate and regular rhythm.      Heart sounds: Normal heart sounds. No murmur heard.     No friction rub. No gallop.   Pulmonary:      Effort: Pulmonary effort is normal. No respiratory distress.      Breath sounds: Normal breath sounds. No wheezing or rales.   Chest:      Chest wall: No tenderness.   Abdominal:      General: Bowel sounds are normal. There is no distension.      Palpations: Abdomen is soft. There is no mass.      Tenderness: There is no abdominal tenderness. There is no guarding or rebound.   Musculoskeletal:         General: No tenderness. Normal range of motion.      Cervical back: Normal range of motion and neck supple.   Skin:     General: Skin is warm and dry.      Coloration: Skin is not pale.      Findings: No erythema or rash.   Neurological:      Mental Status: He is alert and oriented to person, place, and time.   Psychiatric:         Behavior: Behavior normal.

## 2025-05-21 ENCOUNTER — RESULTS FOLLOW-UP (OUTPATIENT)
Dept: INTERNAL MEDICINE | Facility: CLINIC | Age: 45
End: 2025-05-21

## 2025-06-03 ENCOUNTER — E-VISIT (OUTPATIENT)
Dept: INTERNAL MEDICINE | Facility: CLINIC | Age: 45
End: 2025-06-03
Payer: COMMERCIAL

## 2025-06-03 DIAGNOSIS — I10 PRIMARY HYPERTENSION: Primary | ICD-10-CM

## 2025-08-11 DIAGNOSIS — I10 PRIMARY HYPERTENSION: ICD-10-CM

## 2025-08-12 RX ORDER — VALSARTAN 160 MG/1
160 TABLET ORAL DAILY
Qty: 90 TABLET | Refills: 2 | Status: SHIPPED | OUTPATIENT
Start: 2025-08-12 | End: 2026-05-09